# Patient Record
Sex: MALE | Race: WHITE | NOT HISPANIC OR LATINO | Employment: OTHER | URBAN - METROPOLITAN AREA
[De-identification: names, ages, dates, MRNs, and addresses within clinical notes are randomized per-mention and may not be internally consistent; named-entity substitution may affect disease eponyms.]

---

## 2017-11-13 ENCOUNTER — HISTORICAL (OUTPATIENT)
Dept: RADIOLOGY | Facility: HOSPITAL | Age: 62
End: 2017-11-13

## 2018-04-30 ENCOUNTER — HISTORICAL (OUTPATIENT)
Dept: RADIOLOGY | Facility: HOSPITAL | Age: 63
End: 2018-04-30

## 2018-06-12 ENCOUNTER — HISTORICAL (OUTPATIENT)
Dept: RADIOLOGY | Facility: HOSPITAL | Age: 63
End: 2018-06-12

## 2018-10-10 ENCOUNTER — HISTORICAL (OUTPATIENT)
Dept: RADIOLOGY | Facility: HOSPITAL | Age: 63
End: 2018-10-10

## 2023-02-01 DIAGNOSIS — G70.00 MYASTHENIA GRAVIS WITHOUT EXACERBATION: Primary | ICD-10-CM

## 2023-02-01 DIAGNOSIS — G70.00 MYASTHENIA GRAVIS: Primary | ICD-10-CM

## 2023-02-02 ENCOUNTER — HOSPITAL ENCOUNTER (OUTPATIENT)
Dept: RADIOLOGY | Facility: HOSPITAL | Age: 68
Discharge: HOME | End: 2023-02-02
Attending: FAMILY MEDICINE
Payer: MEDICARE

## 2023-02-02 DIAGNOSIS — G70.00 MYASTHENIA GRAVIS WITHOUT EXACERBATION: ICD-10-CM

## 2023-02-02 PROCEDURE — 71250 CT THORAX DX C-: CPT | Mod: TC

## 2023-02-09 ENCOUNTER — OFFICE VISIT (OUTPATIENT)
Dept: NEUROLOGY | Facility: CLINIC | Age: 68
End: 2023-02-09
Payer: MEDICARE

## 2023-02-09 VITALS
WEIGHT: 168 LBS | HEIGHT: 75 IN | BODY MASS INDEX: 20.89 KG/M2 | SYSTOLIC BLOOD PRESSURE: 118 MMHG | DIASTOLIC BLOOD PRESSURE: 74 MMHG

## 2023-02-09 DIAGNOSIS — G70.00 MYASTHENIA GRAVIS: Primary | ICD-10-CM

## 2023-02-09 PROCEDURE — 3008F BODY MASS INDEX DOCD: CPT | Mod: CPTII,S$GLB,, | Performed by: SPECIALIST

## 2023-02-09 PROCEDURE — 99999 PR PBB SHADOW E&M-EST. PATIENT-LVL III: ICD-10-PCS | Mod: PBBFAC,,, | Performed by: SPECIALIST

## 2023-02-09 PROCEDURE — 3008F PR BODY MASS INDEX (BMI) DOCUMENTED: ICD-10-PCS | Mod: CPTII,S$GLB,, | Performed by: SPECIALIST

## 2023-02-09 PROCEDURE — 99205 OFFICE O/P NEW HI 60 MIN: CPT | Mod: S$GLB,,, | Performed by: SPECIALIST

## 2023-02-09 PROCEDURE — 99999 PR PBB SHADOW E&M-EST. PATIENT-LVL III: CPT | Mod: PBBFAC,,, | Performed by: SPECIALIST

## 2023-02-09 PROCEDURE — 4010F PR ACE/ARB THEARPY RXD/TAKEN: ICD-10-PCS | Mod: CPTII,S$GLB,, | Performed by: SPECIALIST

## 2023-02-09 PROCEDURE — 1160F PR REVIEW ALL MEDS BY PRESCRIBER/CLIN PHARMACIST DOCUMENTED: ICD-10-PCS | Mod: CPTII,S$GLB,, | Performed by: SPECIALIST

## 2023-02-09 PROCEDURE — 1159F MED LIST DOCD IN RCRD: CPT | Mod: CPTII,S$GLB,, | Performed by: SPECIALIST

## 2023-02-09 PROCEDURE — 99205 PR OFFICE/OUTPT VISIT, NEW, LEVL V, 60-74 MIN: ICD-10-PCS | Mod: S$GLB,,, | Performed by: SPECIALIST

## 2023-02-09 PROCEDURE — 3074F SYST BP LT 130 MM HG: CPT | Mod: CPTII,S$GLB,, | Performed by: SPECIALIST

## 2023-02-09 PROCEDURE — 3078F PR MOST RECENT DIASTOLIC BLOOD PRESSURE < 80 MM HG: ICD-10-PCS | Mod: CPTII,S$GLB,, | Performed by: SPECIALIST

## 2023-02-09 PROCEDURE — 3074F PR MOST RECENT SYSTOLIC BLOOD PRESSURE < 130 MM HG: ICD-10-PCS | Mod: CPTII,S$GLB,, | Performed by: SPECIALIST

## 2023-02-09 PROCEDURE — 1159F PR MEDICATION LIST DOCUMENTED IN MEDICAL RECORD: ICD-10-PCS | Mod: CPTII,S$GLB,, | Performed by: SPECIALIST

## 2023-02-09 PROCEDURE — 3078F DIAST BP <80 MM HG: CPT | Mod: CPTII,S$GLB,, | Performed by: SPECIALIST

## 2023-02-09 PROCEDURE — 4010F ACE/ARB THERAPY RXD/TAKEN: CPT | Mod: CPTII,S$GLB,, | Performed by: SPECIALIST

## 2023-02-09 PROCEDURE — 1160F RVW MEDS BY RX/DR IN RCRD: CPT | Mod: CPTII,S$GLB,, | Performed by: SPECIALIST

## 2023-02-09 RX ORDER — PREDNISONE 10 MG/1
10 TABLET ORAL DAILY
Qty: 30 TABLET | Refills: 2 | Status: SHIPPED | OUTPATIENT
Start: 2023-02-09 | End: 2023-04-18 | Stop reason: SDUPTHER

## 2023-02-09 RX ORDER — HYDROCHLOROTHIAZIDE 25 MG/1
25 TABLET ORAL DAILY
COMMUNITY
Start: 2023-01-15

## 2023-02-09 RX ORDER — OMEPRAZOLE 20 MG/1
20 CAPSULE, DELAYED RELEASE ORAL DAILY
COMMUNITY
Start: 2023-01-15

## 2023-02-09 RX ORDER — PYRIDOSTIGMINE BROMIDE 60 MG/1
TABLET ORAL
Qty: 90 TABLET | Refills: 2 | Status: SHIPPED | OUTPATIENT
Start: 2023-02-09 | End: 2023-04-18

## 2023-02-09 RX ORDER — AMLODIPINE AND BENAZEPRIL HYDROCHLORIDE 5; 20 MG/1; MG/1
1 CAPSULE ORAL
COMMUNITY
Start: 2023-01-10

## 2023-02-09 RX ORDER — ATORVASTATIN CALCIUM 20 MG/1
20 TABLET, FILM COATED ORAL DAILY
COMMUNITY
Start: 2022-12-18

## 2023-02-09 NOTE — PROGRESS NOTES
"Subjective:       Patient ID: Ronak Saha is a 67 y.o. male.    Chief Complaint: NP ref by Dr Sibley for neuro cons to eval for MG.    HPI:            Pts wife is here.   Pt diagnosed with MG abt 3 wks ago.   Pt c/o double vision, droopy eyelids .   Denies diff w swallowing, speech or weakness of limbs.      notes may also be on facesheet for HPI, ROS, and other sections     Review of Systems          Social History     Socioeconomic History    Marital status:    Tobacco Use    Smoking status: Never     Passive exposure: Never    Smokeless tobacco: Never   Substance and Sexual Activity    Alcohol use: Never    Drug use: Never     ----------------------------  Hypertension      Current Outpatient Medications:     amlodipine-benazepril 5-20 mg (LOTREL) 5-20 mg per capsule, Take 1 capsule by mouth., Disp: , Rfl:     atorvastatin (LIPITOR) 20 MG tablet, Take 20 mg by mouth., Disp: , Rfl:     hydroCHLOROthiazide (HYDRODIURIL) 25 MG tablet, Take 25 mg by mouth., Disp: , Rfl:     omeprazole (PRILOSEC) 20 MG capsule, Take 20 mg by mouth., Disp: , Rfl:      Objective:        Exam:   /74   Ht 6' 3" (1.905 m)   Wt 76.2 kg (168 lb)   BMI 21.00 kg/m²     General Exam  if accompanied, by__ wife  body habitus_ Body mass index is 21 kg/m².    mental status_alert and appropriate  oropharynx_Mallampati grade_4  neck_  Heart__ RRR  Extremities_ no edema   skin_    Neurological:  cortical function__ ok   Speech __ ok  good volume   cranial nerves:  CN 2 VF_ok   fundi_   CN 3, 4, 6 EOMs_mild dysconj gaze   squints either eye for diplopia   CN 3, pupils_ok    CN 7_no lower face asymmetry  CN 8_hearing _ chr hearing impairment L ear   CN 12 tongue_ok    Motor__ arose from ch with arms folded but slowly   tone:   muscle stretch reflexes__ trace to absent equal   Vib sens_  Pin sens_  Plantars__ not tested   tremor: _ none   coordination: _ F to N ok   gait_ ok for age   Romberg:     imaging:  Chest CT had been ok "   Report reviewed     Labs: pos AchR Ab     _._ new patient   ___ multiple issues/ diagnoses or problems  [if not enumerated in note then discussed in encounter but not documented]    complexity of data     __high _.mod   __ images and reports reviewed:  __ hx obtained from family or accompaniment:   __other studies reviewed   __studies ordered __   __studies considered or discussed but not ordered __  __DDx discussed __    Risks    _._high _mod   __ (possible or definite) neurodegenerative condition and inherent progression  _._ (poss or def) autoimmune condition with possibility of flares or unexpected attack  __ (poss or def) seiz d.o. with possib of recurr seiz's   __ cerebrovasc ds with risk of recurrence of stroke  _._ CNS meds (and/or) potentially high risk non CNS meds which may cause medical or behavioral side effects  _._ fall risk  _._ driving discussed   __ diagnosis unclear or DDx wide making risk uncertain to high  __other:    MDM/Medical Decision Making     _._high  _moderate         Assessment/Plan:       Problem List Items Addressed This Visit    None  Visit Diagnoses       Myasthenia gravis    -  Primary              Other comments/ follow up:           Medications Ordered This Encounter   Medications    predniSONE (DELTASONE) 10 MG tablet     Sig: Take 1 tablet (10 mg total) by mouth once daily.     Dispense:  30 tablet     Refill:  2    pyridostigmine (MESTINON) 60 mg Tab     Sig: Half tab breakfast one week then half tab breakfast and lunch one week then half tab three times daily with meals one week then whole tab three times daily with meals     Dispense:  90 tablet     Refill:  2        Aim follow up __2_ months  _MD STEPHEN GuerraA

## 2023-04-18 ENCOUNTER — OFFICE VISIT (OUTPATIENT)
Dept: NEUROLOGY | Facility: CLINIC | Age: 68
End: 2023-04-18
Payer: MEDICARE

## 2023-04-18 VITALS
BODY MASS INDEX: 34.19 KG/M2 | WEIGHT: 275 LBS | HEIGHT: 75 IN | SYSTOLIC BLOOD PRESSURE: 118 MMHG | DIASTOLIC BLOOD PRESSURE: 74 MMHG

## 2023-04-18 DIAGNOSIS — G70.00 MYASTHENIA GRAVIS: Primary | ICD-10-CM

## 2023-04-18 PROCEDURE — 1101F PR PT FALLS ASSESS DOC 0-1 FALLS W/OUT INJ PAST YR: ICD-10-PCS | Mod: CPTII,S$GLB,, | Performed by: NURSE PRACTITIONER

## 2023-04-18 PROCEDURE — 3008F PR BODY MASS INDEX (BMI) DOCUMENTED: ICD-10-PCS | Mod: CPTII,S$GLB,, | Performed by: NURSE PRACTITIONER

## 2023-04-18 PROCEDURE — 3078F DIAST BP <80 MM HG: CPT | Mod: CPTII,S$GLB,, | Performed by: NURSE PRACTITIONER

## 2023-04-18 PROCEDURE — 1159F MED LIST DOCD IN RCRD: CPT | Mod: CPTII,S$GLB,, | Performed by: NURSE PRACTITIONER

## 2023-04-18 PROCEDURE — 3074F SYST BP LT 130 MM HG: CPT | Mod: CPTII,S$GLB,, | Performed by: NURSE PRACTITIONER

## 2023-04-18 PROCEDURE — 3288F PR FALLS RISK ASSESSMENT DOCUMENTED: ICD-10-PCS | Mod: CPTII,S$GLB,, | Performed by: NURSE PRACTITIONER

## 2023-04-18 PROCEDURE — 4010F ACE/ARB THERAPY RXD/TAKEN: CPT | Mod: CPTII,S$GLB,, | Performed by: NURSE PRACTITIONER

## 2023-04-18 PROCEDURE — 3074F PR MOST RECENT SYSTOLIC BLOOD PRESSURE < 130 MM HG: ICD-10-PCS | Mod: CPTII,S$GLB,, | Performed by: NURSE PRACTITIONER

## 2023-04-18 PROCEDURE — 1159F PR MEDICATION LIST DOCUMENTED IN MEDICAL RECORD: ICD-10-PCS | Mod: CPTII,S$GLB,, | Performed by: NURSE PRACTITIONER

## 2023-04-18 PROCEDURE — 99214 PR OFFICE/OUTPT VISIT, EST, LEVL IV, 30-39 MIN: ICD-10-PCS | Mod: S$GLB,,, | Performed by: NURSE PRACTITIONER

## 2023-04-18 PROCEDURE — 4010F PR ACE/ARB THEARPY RXD/TAKEN: ICD-10-PCS | Mod: CPTII,S$GLB,, | Performed by: NURSE PRACTITIONER

## 2023-04-18 PROCEDURE — 3288F FALL RISK ASSESSMENT DOCD: CPT | Mod: CPTII,S$GLB,, | Performed by: NURSE PRACTITIONER

## 2023-04-18 PROCEDURE — 99214 OFFICE O/P EST MOD 30 MIN: CPT | Mod: S$GLB,,, | Performed by: NURSE PRACTITIONER

## 2023-04-18 PROCEDURE — 99999 PR PBB SHADOW E&M-EST. PATIENT-LVL III: CPT | Mod: PBBFAC,,, | Performed by: SPECIALIST

## 2023-04-18 PROCEDURE — 3078F PR MOST RECENT DIASTOLIC BLOOD PRESSURE < 80 MM HG: ICD-10-PCS | Mod: CPTII,S$GLB,, | Performed by: NURSE PRACTITIONER

## 2023-04-18 PROCEDURE — 3008F BODY MASS INDEX DOCD: CPT | Mod: CPTII,S$GLB,, | Performed by: NURSE PRACTITIONER

## 2023-04-18 PROCEDURE — 99999 PR PBB SHADOW E&M-EST. PATIENT-LVL III: ICD-10-PCS | Mod: PBBFAC,,, | Performed by: SPECIALIST

## 2023-04-18 PROCEDURE — 1101F PT FALLS ASSESS-DOCD LE1/YR: CPT | Mod: CPTII,S$GLB,, | Performed by: NURSE PRACTITIONER

## 2023-04-18 RX ORDER — AZATHIOPRINE 50 MG/1
TABLET ORAL
Qty: 60 TABLET | Refills: 5 | Status: SHIPPED | OUTPATIENT
Start: 2023-04-18 | End: 2023-05-22

## 2023-04-18 RX ORDER — PYRIDOSTIGMINE BROMIDE 60 MG/1
TABLET ORAL
Qty: 90 TABLET | Refills: 2
Start: 2023-04-18 | End: 2023-05-14 | Stop reason: SDUPTHER

## 2023-04-18 RX ORDER — FLUTICASONE PROPIONATE 50 MCG
2 SPRAY, SUSPENSION (ML) NASAL
COMMUNITY
Start: 2023-04-01

## 2023-04-18 RX ORDER — PREDNISONE 10 MG/1
10 TABLET ORAL DAILY
Qty: 30 TABLET | Refills: 2 | Status: SHIPPED | OUTPATIENT
Start: 2023-04-18 | End: 2023-07-14 | Stop reason: SDUPTHER

## 2023-04-18 NOTE — PROGRESS NOTES
Established Patient   SUBJECTIVE:    Patient ID: Ronak Saha , 68 y.o.    Past Medical History:   Diagnosis Date    Hypertension        Past Surgical History:   Procedure Laterality Date    CHOLECYSTECTOMY      HERNIA REPAIR         Family History   Problem Relation Age of Onset    No Known Problems Mother     No Known Problems Father        Social History     Socioeconomic History    Marital status:    Tobacco Use    Smoking status: Never     Passive exposure: Never    Smokeless tobacco: Never   Substance and Sexual Activity    Alcohol use: Never    Drug use: Never       Review of patient's allergies indicates:   Allergen Reactions    Penicillins Rash    Cephalexin Palpitations       Chief Complaint: MG f/u    History of Present Illness:     Here for 2 month MG f/u     Pt reports blurred/double vision and eyelid drooping has almost completely resolved since start of medications.     Taking Mestinon 60 mg TID and prednisone 10 mg daily, tolerating well.    Review of Systems - as per HPI, otherwise a balanced 10 systems review is negative.      Current Medications:    Current Outpatient Medications:     amlodipine-benazepril 5-20 mg (LOTREL) 5-20 mg per capsule, Take 1 capsule by mouth., Disp: , Rfl:     atorvastatin (LIPITOR) 20 MG tablet, Take 20 mg by mouth., Disp: , Rfl:     fluticasone propionate (FLONASE) 50 mcg/actuation nasal spray, 2 sprays by Each Nostril route., Disp: , Rfl:     hydroCHLOROthiazide (HYDRODIURIL) 25 MG tablet, Take 25 mg by mouth., Disp: , Rfl:     omeprazole (PRILOSEC) 20 MG capsule, Take 20 mg by mouth., Disp: , Rfl:     predniSONE (DELTASONE) 10 MG tablet, Take 1 tablet (10 mg total) by mouth once daily., Disp: 30 tablet, Rfl: 2    pyridostigmine (MESTINON) 60 mg Tab, Half tab breakfast one week then half tab breakfast and lunch one week then half tab three times daily with meals one week then whole tab three times daily with meals, Disp: 90 tablet, Rfl:  "2      OBJECTIVE:    Vitals:  /74 (BP Location: Left arm, Patient Position: Sitting)   Ht 6' 3" (1.905 m)   Wt 124.7 kg (275 lb)   BMI 34.37 kg/m²      Physical Exam:  Constitutional  he appears well-developed and well-nourished. he is well groomed.    Accompanied by - self  Appearance - well appearing, no apparent distress, unassisted  Heart - RRR auscultated without murmur  Lungs - CTA   Skin- no obvious lesions noted    Neurologic  Cortical function - The patient is alert, attentive, and oriented  Speech - clear   Cranial nerves:  CN 3, 4, 6 EOMs - normal. Slight ptosis    CN 7 - no face asymmetry; normal eye closure and smile  CN 8 - chr hearing impairment L ear   Motor - grossly normal; unable to arise with arms folded  Gait - unassisted; posture stooped              ASSESSMENT /PLAN:    Problem List Items Addressed This Visit      Visit Diagnoses       Myasthenia gravis    -  Primary    Start Imuran 50mg,   Wk 1-2: 1 tab daily    Starting in May: take 2 tabs daily   Anticipated response and possible side effects of new medication discussed. Pt denies questions at this time. Instructed pt to call with any questions or concerns     Continue Prednisone 10mg, 1 daily + Mestinon 60mg, 1 TID    CBC, CMP in early May    F/u in 4 wks                 Questions and concerns were sought and answered to the patient's stated verbal satisfaction.    The patient verbalizes understanding and agreement with the above stated treatment plan.   Dr. Pena was available during today's encounter.     Items discussed include acute and/or chronic neurological, sleep, or other issues and their attendant differential diagnoses.  Potential for additional testing, treatment options, and prognosis also discussed.    __*_single dx ___multiple issues/ diagnoses  ___ low __* mod ___ high complexity of data  ___low _*_mod ___ high risks     Medical Decision Making (MDM) used for CPT " choice:  ___low  _*__moderate  ____high        ERICK Titus  [facility]  [phone]

## 2023-04-30 ENCOUNTER — HOSPITAL ENCOUNTER (EMERGENCY)
Facility: HOSPITAL | Age: 68
Discharge: LEFT BEFORE DISCHARGE | End: 2023-05-01
Attending: INTERNAL MEDICINE
Payer: MEDICARE

## 2023-04-30 DIAGNOSIS — A41.9 SEPSIS, DUE TO UNSPECIFIED ORGANISM, UNSPECIFIED WHETHER ACUTE ORGAN DYSFUNCTION PRESENT: ICD-10-CM

## 2023-04-30 DIAGNOSIS — R50.9 FEVER, UNSPECIFIED FEVER CAUSE: Primary | ICD-10-CM

## 2023-04-30 DIAGNOSIS — R00.0 TACHYCARDIA: ICD-10-CM

## 2023-04-30 LAB
ALBUMIN SERPL-MCNC: 3.1 G/DL (ref 3.4–4.8)
ALBUMIN/GLOB SERPL: 0.9 RATIO (ref 1.1–2)
ALP SERPL-CCNC: 79 UNIT/L (ref 40–150)
ALT SERPL-CCNC: 22 UNIT/L (ref 0–55)
APPEARANCE UR: CLEAR
AST SERPL-CCNC: 18 UNIT/L (ref 5–34)
BACTERIA #/AREA URNS AUTO: ABNORMAL /HPF
BASOPHILS # BLD AUTO: 0.05 X10(3)/MCL (ref 0–0.2)
BASOPHILS NFR BLD AUTO: 0.3 %
BILIRUB UR QL STRIP.AUTO: NEGATIVE MG/DL
BILIRUBIN DIRECT+TOT PNL SERPL-MCNC: 1.5 MG/DL
BUN SERPL-MCNC: 15 MG/DL (ref 8.4–25.7)
CALCIUM SERPL-MCNC: 9.4 MG/DL (ref 8.8–10)
CHLORIDE SERPL-SCNC: 98 MMOL/L (ref 98–107)
CO2 SERPL-SCNC: 23 MMOL/L (ref 23–31)
COLOR UR AUTO: ABNORMAL
CREAT SERPL-MCNC: 0.9 MG/DL (ref 0.73–1.18)
EOSINOPHIL # BLD AUTO: 0.13 X10(3)/MCL (ref 0–0.9)
EOSINOPHIL NFR BLD AUTO: 0.8 %
ERYTHROCYTE [DISTWIDTH] IN BLOOD BY AUTOMATED COUNT: 15.5 % (ref 11.5–17)
FLUAV AG UPPER RESP QL IA.RAPID: NOT DETECTED
FLUBV AG UPPER RESP QL IA.RAPID: NOT DETECTED
GFR SERPLBLD CREATININE-BSD FMLA CKD-EPI: >60 MLS/MIN/1.73/M2
GLOBULIN SER-MCNC: 3.5 GM/DL (ref 2.4–3.5)
GLUCOSE SERPL-MCNC: 172 MG/DL (ref 82–115)
GLUCOSE UR QL STRIP.AUTO: NEGATIVE MG/DL
HCT VFR BLD AUTO: 48.3 % (ref 42–52)
HGB BLD-MCNC: 15.9 G/DL (ref 14–18)
IMM GRANULOCYTES # BLD AUTO: 0.11 X10(3)/MCL (ref 0–0.04)
IMM GRANULOCYTES NFR BLD AUTO: 0.7 %
KETONES UR QL STRIP.AUTO: ABNORMAL MG/DL
LACTATE SERPL-SCNC: 1.7 MMOL/L (ref 0.5–2.2)
LACTATE SERPL-SCNC: 2.1 MMOL/L (ref 0.5–2.2)
LEUKOCYTE ESTERASE UR QL STRIP.AUTO: NEGATIVE UNIT/L
LYMPHOCYTES # BLD AUTO: 0.23 X10(3)/MCL (ref 0.6–4.6)
LYMPHOCYTES NFR BLD AUTO: 1.4 %
MCH RBC QN AUTO: 29.2 PG (ref 27–31)
MCHC RBC AUTO-ENTMCNC: 32.9 G/DL (ref 33–36)
MCV RBC AUTO: 88.8 FL (ref 80–94)
MONOCYTES # BLD AUTO: 0.68 X10(3)/MCL (ref 0.1–1.3)
MONOCYTES NFR BLD AUTO: 4.2 %
MUCOUS THREADS URNS QL MICRO: ABNORMAL /LPF
NEUTROPHILS # BLD AUTO: 14.9 X10(3)/MCL (ref 2.1–9.2)
NEUTROPHILS NFR BLD AUTO: 92.6 %
NITRITE UR QL STRIP.AUTO: NEGATIVE
PH UR STRIP.AUTO: 6 [PH]
PLATELET # BLD AUTO: 213 X10(3)/MCL (ref 130–400)
PMV BLD AUTO: 9.8 FL (ref 7.4–10.4)
POTASSIUM SERPL-SCNC: 3.8 MMOL/L (ref 3.5–5.1)
PROT SERPL-MCNC: 6.6 GM/DL (ref 5.8–7.6)
PROT UR QL STRIP.AUTO: ABNORMAL MG/DL
RBC # BLD AUTO: 5.44 X10(6)/MCL (ref 4.7–6.1)
RBC #/AREA URNS AUTO: ABNORMAL /HPF
RBC UR QL AUTO: ABNORMAL UNIT/L
RSV A 5' UTR RNA NPH QL NAA+PROBE: NOT DETECTED
SARS-COV-2 RNA RESP QL NAA+PROBE: NOT DETECTED
SODIUM SERPL-SCNC: 133 MMOL/L (ref 136–145)
SP GR UR STRIP.AUTO: 1.02
SQUAMOUS #/AREA URNS AUTO: ABNORMAL /HPF
UROBILINOGEN UR STRIP-ACNC: 2 MG/DL
WBC # SPEC AUTO: 16.1 X10(3)/MCL (ref 4.5–11.5)
WBC #/AREA URNS AUTO: ABNORMAL /HPF

## 2023-04-30 PROCEDURE — 83605 ASSAY OF LACTIC ACID: CPT | Performed by: INTERNAL MEDICINE

## 2023-04-30 PROCEDURE — 25000003 PHARM REV CODE 250: Performed by: INTERNAL MEDICINE

## 2023-04-30 PROCEDURE — 96365 THER/PROPH/DIAG IV INF INIT: CPT

## 2023-04-30 PROCEDURE — 81001 URINALYSIS AUTO W/SCOPE: CPT | Performed by: INTERNAL MEDICINE

## 2023-04-30 PROCEDURE — 93005 ELECTROCARDIOGRAM TRACING: CPT

## 2023-04-30 PROCEDURE — 0241U COVID/RSV/FLU A&B PCR: CPT | Performed by: INTERNAL MEDICINE

## 2023-04-30 PROCEDURE — 63600175 PHARM REV CODE 636 W HCPCS: Performed by: INTERNAL MEDICINE

## 2023-04-30 PROCEDURE — 99285 EMERGENCY DEPT VISIT HI MDM: CPT | Mod: 25

## 2023-04-30 PROCEDURE — 93010 EKG 12-LEAD: ICD-10-PCS | Mod: ,,, | Performed by: INTERNAL MEDICINE

## 2023-04-30 PROCEDURE — 85025 COMPLETE CBC W/AUTO DIFF WBC: CPT | Performed by: INTERNAL MEDICINE

## 2023-04-30 PROCEDURE — 87040 BLOOD CULTURE FOR BACTERIA: CPT | Performed by: INTERNAL MEDICINE

## 2023-04-30 PROCEDURE — 93010 ELECTROCARDIOGRAM REPORT: CPT | Mod: ,,, | Performed by: INTERNAL MEDICINE

## 2023-04-30 PROCEDURE — 80053 COMPREHEN METABOLIC PANEL: CPT | Performed by: INTERNAL MEDICINE

## 2023-04-30 PROCEDURE — 96361 HYDRATE IV INFUSION ADD-ON: CPT

## 2023-04-30 RX ORDER — ACETAMINOPHEN 500 MG
1000 TABLET ORAL EVERY 6 HOURS PRN
Status: DISCONTINUED | OUTPATIENT
Start: 2023-04-30 | End: 2023-05-01 | Stop reason: HOSPADM

## 2023-04-30 RX ORDER — IBUPROFEN 600 MG/1
600 TABLET ORAL
Status: COMPLETED | OUTPATIENT
Start: 2023-04-30 | End: 2023-04-30

## 2023-04-30 RX ORDER — SODIUM CHLORIDE, SODIUM LACTATE, POTASSIUM CHLORIDE, CALCIUM CHLORIDE 600; 310; 30; 20 MG/100ML; MG/100ML; MG/100ML; MG/100ML
1000 INJECTION, SOLUTION INTRAVENOUS CONTINUOUS
Status: DISCONTINUED | OUTPATIENT
Start: 2023-04-30 | End: 2023-05-01 | Stop reason: HOSPADM

## 2023-04-30 RX ADMIN — ACETAMINOPHEN 1000 MG: 500 TABLET ORAL at 09:04

## 2023-04-30 RX ADMIN — SODIUM CHLORIDE, POTASSIUM CHLORIDE, SODIUM LACTATE AND CALCIUM CHLORIDE 2535 ML: 600; 310; 30; 20 INJECTION, SOLUTION INTRAVENOUS at 08:04

## 2023-04-30 RX ADMIN — SODIUM CHLORIDE, POTASSIUM CHLORIDE, SODIUM LACTATE AND CALCIUM CHLORIDE 1000 ML: 600; 310; 30; 20 INJECTION, SOLUTION INTRAVENOUS at 10:04

## 2023-04-30 RX ADMIN — IBUPROFEN 600 MG: 600 TABLET, FILM COATED ORAL at 10:04

## 2023-04-30 RX ADMIN — TIGECYCLINE 100 MG: 50 INJECTION, POWDER, LYOPHILIZED, FOR SOLUTION INTRAVENOUS; PARENTERAL at 10:04

## 2023-05-01 ENCOUNTER — TELEPHONE (OUTPATIENT)
Dept: NEUROLOGY | Facility: CLINIC | Age: 68
End: 2023-05-01
Payer: MEDICARE

## 2023-05-01 VITALS
SYSTOLIC BLOOD PRESSURE: 113 MMHG | HEART RATE: 109 BPM | DIASTOLIC BLOOD PRESSURE: 68 MMHG | TEMPERATURE: 99 F | WEIGHT: 250 LBS | RESPIRATION RATE: 18 BRPM | BODY MASS INDEX: 31.25 KG/M2 | OXYGEN SATURATION: 94 %

## 2023-05-01 NOTE — TELEPHONE ENCOUNTER
Patient states he is having issues that caused him to spend night in ED last night.     Per ED note, pt arrived with fever, chills, body aches, and tachycardia.    Per patient ED physician had some concerns about Imuran 50mg that was started during last OV here on 04/18/2023 having side effects for pt.     Pt requesting call back to further discuss this med.     Phone: [phone]

## 2023-05-01 NOTE — ED PROVIDER NOTES
05/01/2023         10:02 PM    Source of History:  History obtained from the patient.     Chief complaint:  From Nurse Triage:  Fever (States began yesterday with fever, body aches, and congestion. )    HISTORY OF PRESENT ILLNES:  Ronak Saha is a 68 y.o. male  has a past medical history of Hypertension, Meniere's disease, unspecified ear, and Myasthenia gravis. presenting with Fever (States began yesterday with fever, body aches, and congestion. )      REVIEW OF SYSTEMS:   Constitutional symptoms:  Fever. Chills    Skin symptoms:  No Rash.    Eye symptoms:  No Visual disturbance reported.   ENMT symptoms:  No Sore throat,  sinus congestion, runny nose  Respiratory symptoms:  No Shortness of Breath, Cough, no Wheezing.    Cardiovascular symptoms:  No Chest Pain, No Palpitations.   Gastrointestinal symptoms:  No Abdominal Pain, No Nausea, No Vomiting, No Diarrhea, No Constipation.    Genitourinary symptoms:  No Dysuria,    Musculoskeletal symptoms:  No Back pain,    Neurologic symptoms:  No Headache, No Dizziness.    Psychiatric symptoms:  No Anxiety, No Depression, No Substance Abuse.              Additional review of systems information: Patient Denies Any Other Complaints.    All Other Systems Reviewed With Patient And Negative.    ALLEGIES:  Review of patient's allergies indicates:   Allergen Reactions    Penicillins Rash    Cephalexin Palpitations       MEDICINE LIST:  Current Outpatient Medications   Medication Instructions    amlodipine-benazepril 5-20 mg (LOTREL) 5-20 mg per capsule 1 capsule, Oral    atorvastatin (LIPITOR) 20 mg, Oral    azaTHIOprine (IMURAN) 50 mg Tab Wk 1-2: 1 tab daily; Starting in May: take 2 tabs daily    fluticasone propionate (FLONASE) 50 mcg/actuation nasal spray 2 sprays, Each Nostril    hydroCHLOROthiazide (HYDRODIURIL) 25 mg, Oral    omeprazole (PRILOSEC) 20 mg, Oral    predniSONE (DELTASONE) 10 mg, Oral, Daily    pyridostigmine (MESTINON) 60 mg Tab 1 tab three times daily  with meals        PMH:  As per HPI and below:    Reviewed and updated in chart.    PAST MEDICAL HISTORY:  Past Medical History:   Diagnosis Date    Hypertension     Meniere's disease, unspecified ear     Myasthenia gravis         PAST SURGICAL HISTORY:  Past Surgical History:   Procedure Laterality Date    CHOLECYSTECTOMY      HERNIA REPAIR         SOCIAL HISTORY:  Social History     Tobacco Use    Smoking status: Never     Passive exposure: Never    Smokeless tobacco: Never   Substance Use Topics    Alcohol use: Never    Drug use: Never       FAMILY HISTORY:  Family History   Problem Relation Age of Onset    No Known Problems Mother     No Known Problems Father         PROBLEM LIST:  There is no problem list on file for this patient.       PHYSICAL EXAM:      ED Triage Vitals [04/30/23 1932]   /78   Pulse (!) 152   Resp 20   Temp (!) 102.9 °F (39.4 °C)   SpO2 95 %        Vital Signs: Reviewed As In Chart.  General:  Alert, No Cardiorespiratory Distress Noted.   Eye:  Extraocular Movements Are Intact.   ENT: Mucus membranes are moist.   Cardiovascular:  Regular tachycardia, No Murmur, No Pedal Edema.    Respiratory:  Respirations Nonlabored, No Respiratory Distress, Good Bilateral Air Entry, No Rales, No Rhonchi.    Gastrointestinal:  Soft, Non Distended, Non Tenderness, Normal Bowel Sounds.    Neurological:  Alert And Oriented To Person, Place, Time, And Situation, Normal Motor Observed, Normal Speech Observed.  Musculoskeletal:  No Gross Deformity Noted.     Psychiatric:  Cooperative.      ED WORKUP FOR MEDICAL DECISION MAKING:    ED ORDERS:  Orders Placed This Encounter   Procedures    Critical Care    Blood Culture    Blood Culture    X-Ray Chest AP Portable    CT Head Without Contrast    CBC auto differential    Comprehensive metabolic panel    Lactic acid, plasma    Urinalysis, Reflex to Urine Culture    CBC with Differential    Lactic Acid, Plasma    COVID/RSV/FLU A&B PCR    Urinalysis, Microscopic     ED Preference List Used to Initiate Sepsis Orders    Vital signs    Cardiac Monitoring - Adult    Strict intake and output    Pulse Oximetry Continuous    EKG 12-lead    Saline lock IV       ED MEDICINES:  Medications   acetaminophen tablet 1,000 mg (1,000 mg Oral Given 4/30/23 2121)   lactated ringers infusion (1,000 mLs Intravenous New Bag 4/30/23 2212)   lactated ringers bolus 2,535 mL (0 mLs Intravenous Stopped 4/30/23 2124)   tigecycline (TYGACIL) 100 mg in dextrose 5 % (D5W) 100 mL IVPB (0 mg Intravenous Stopped 4/30/23 2301)   ibuprofen tablet 600 mg (600 mg Oral Given 4/30/23 2231)            ECG Results              EKG 12-lead (Preliminary result)  Result time 04/30/23 22:08:40      Wet Read by Alexa Ward MD (04/30/23 22:08:40, [facility] - Emergency Dept, Emergency Medicine)    EKG: Independently reviewed and / or Interpreted by Alexa Ward MD. independently as Normal Sinus Rhythm, Rate 144, Sinus Tachycardia, LVH, Left Anterior Fascicular Block, No STEMI, Left Axis Deviation , artifact in the baseline.                                    ED LABS ORDERED AND REVIEWED:  Admission on 04/30/2023   Component Date Value Ref Range Status    Sodium Level 04/30/2023 133 (L)  136 - 145 mmol/L Final    Potassium Level 04/30/2023 3.8  3.5 - 5.1 mmol/L Final    Chloride 04/30/2023 98  98 - 107 mmol/L Final    Carbon Dioxide 04/30/2023 23  23 - 31 mmol/L Final    Glucose Level 04/30/2023 172 (H)  82 - 115 mg/dL Final    Blood Urea Nitrogen 04/30/2023 15.0  8.4 - 25.7 mg/dL Final    Creatinine 04/30/2023 0.90  0.73 - 1.18 mg/dL Final    Calcium Level Total 04/30/2023 9.4  8.8 - 10.0 mg/dL Final    Protein Total 04/30/2023 6.6  5.8 - 7.6 gm/dL Final    Albumin Level 04/30/2023 3.1 (L)  3.4 - 4.8 g/dL Final    Globulin 04/30/2023 3.5  2.4 - 3.5 gm/dL Final    Albumin/Globulin Ratio 04/30/2023 0.9 (L)  1.1 - 2.0 ratio Final    Bilirubin Total 04/30/2023 1.5  <=1.5 mg/dL Final    Alkaline Phosphatase  04/30/2023 79  40 - 150 unit/L Final    Alanine Aminotransferase 04/30/2023 22  0 - 55 unit/L Final    Aspartate Aminotransferase 04/30/2023 18  5 - 34 unit/L Final    eGFR 04/30/2023 >60  mls/min/1.73/m2 Final    Lactic Acid Level 04/30/2023 2.1  0.5 - 2.2 mmol/L Final    Color, UA 04/30/2023 Orange (A)  Yellow, Light-Yellow, Dark Yellow, Gege, Straw Final    Appearance, UA 04/30/2023 Clear  Clear Final    Specific Gravity, UA 04/30/2023 1.020   Final    pH, UA 04/30/2023 6.0  5.0 - 8.5 Final    Protein, UA 04/30/2023 1+ (A)  Negative mg/dL Final    Glucose, UA 04/30/2023 Negative  Negative, Normal mg/dL Final    Ketones, UA 04/30/2023 Trace (A)  Negative mg/dL Final    Blood, UA 04/30/2023 Trace-Intact (A)  Negative unit/L Final    Bilirubin, UA 04/30/2023 Negative  Negative mg/dL Final    Urobilinogen, UA 04/30/2023 2.0 (A)  0.2, 1.0, Normal mg/dL Final    Nitrites, UA 04/30/2023 Negative  Negative Final    Leukocyte Esterase, UA 04/30/2023 Negative  Negative unit/L Final    WBC 04/30/2023 16.1 (H)  4.5 - 11.5 x10(3)/mcL Final    RBC 04/30/2023 5.44  4.70 - 6.10 x10(6)/mcL Final    Hgb 04/30/2023 15.9  14.0 - 18.0 g/dL Final    Hct 04/30/2023 48.3  42.0 - 52.0 % Final    MCV 04/30/2023 88.8  80.0 - 94.0 fL Final    MCH 04/30/2023 29.2  27.0 - 31.0 pg Final    MCHC 04/30/2023 32.9 (L)  33.0 - 36.0 g/dL Final    RDW 04/30/2023 15.5  11.5 - 17.0 % Final    Platelet 04/30/2023 213  130 - 400 x10(3)/mcL Final    MPV 04/30/2023 9.8  7.4 - 10.4 fL Final    Neut % 04/30/2023 92.6  % Final    Lymph % 04/30/2023 1.4  % Final    Mono % 04/30/2023 4.2  % Final    Eos % 04/30/2023 0.8  % Final    Basophil % 04/30/2023 0.3  % Final    Lymph # 04/30/2023 0.23 (L)  0.6 - 4.6 x10(3)/mcL Final    Neut # 04/30/2023 14.90 (H)  2.1 - 9.2 x10(3)/mcL Final    Mono # 04/30/2023 0.68  0.1 - 1.3 x10(3)/mcL Final    Eos # 04/30/2023 0.13  0 - 0.9 x10(3)/mcL Final    Baso # 04/30/2023 0.05  0 - 0.2 x10(3)/mcL Final    IG# 04/30/2023 0.11  (H)  0 - 0.04 x10(3)/mcL Final    IG% 04/30/2023 0.7  % Final    Influenza A PCR 04/30/2023 Not Detected  Not Detected Final    Influenza B PCR 04/30/2023 Not Detected  Not Detected Final    Respiratory Syncytial Virus PCR 04/30/2023 Not Detected  Not Detected Final    SARS-CoV-2 PCR 04/30/2023 Not Detected  Not Detected, Negative, Invalid Final    Bacteria, UA 04/30/2023 Rare  None Seen, Rare, Occasional /HPF Final    Mucous, UA 04/30/2023 Trace (A)  None Seen /LPF Final    RBC, UA 04/30/2023 3-5  None Seen, 0-2, 3-5, 0-5 /HPF Final    WBC, UA 04/30/2023 0-2  None Seen, 0-2, 3-5, 0-5 /HPF Final    Squamous Epithelial Cells, UA 04/30/2023 Few (A)  None Seen, Rare, Occasional, Occ /HPF Final    Lactic Acid Level 04/30/2023 1.7  0.5 - 2.2 mmol/L Final       RADIOLOGY STUDIES ORDERED AND REVIEWED:  Imaging Results              CT Head Without Contrast (Preliminary result)  Result time 04/30/23 23:53:03      Preliminary result by Kristopher Rodriguez MD (04/30/23 23:53:03)                   Narrative:    START OF REPORT:  Technique: CT of the head was performed without intravenous contrast with axial as well as coronal and sagittal images.    Comparison: None.    Dosage Information: Automated Exposure Control was utilized 959.15 mGy.cm.    Clinical history: Headache.    Findings:  Artifact: There is streak artifact on a few of the images/ in the posterior fossa which decreases sensitivity and specificity of the study for subtle intracranial hemorrhage.  Hemorrhage: No acute intracranial hemorrhage is seen.  CSF spaces: The ventricles, sulci and basal cisterns all appear mildly prominent consistent with global cerebral atrophy.  Brain parenchyma: There is preservation of the grey white junction throughout. Mild microvascular change is seen in portions of the periventricular and deep white matter tracts.  Cerebellum: Unremarkable.  Vascular: Severe atheromatous calcification of the intracranial arteries is seen.  Sella and  skull base: The sella appears to be within normal limits for age.  Herniation: None.  Intracranial calcifications: Incidental note is made of bilateral choroid plexus calcification. Incidental note is made of some pineal region calcification. Incidental note is made of some calcification of the falx.  Calvarium: No acute linear or depressed skull fracture is seen.    Maxillofacial Structures:  Paranasal sinuses: The visualized paranasal sinuses appear clear with no mucoperiosteal thickening or air fluid levels identified.  Orbits: The orbits appear unremarkable.  Zygomatic arches: The zygomatic arches are intact and unremarkable.  Temporal bones and mastoids: The temporal bones and mastoids appear unremarkable.  TMJ: The mandibular condyles appear normally placed with respect to the mandibular fossa.      Impression:  1. No acute intracranial process identified. Details and other findings as noted above.                                         X-Ray Chest AP Portable (Preliminary result)  Result time 04/30/23 21:18:40      Wet Read by Alexa Ward MD (04/30/23 21:18:40, [facility] - Emergency Dept, Emergency Medicine)    Chest One View:  Independently reviewed and/or interpreted by Alexa Ward MD.  No Focal Consolidation, No Acute Cardiopulmonary abnormality identified grossly.                                    MEDICAL DECISION MAKING:      Reviewed Nurses Note. Reviewed Vital Signs.     Reviewed Pertinent old records, History and updated as necessary.    Vitals:    05/01/23 0100   BP: 113/68   Pulse: 109   Resp: 18   Temp: 99 °F (37.2 °C)        Medical Decision Making  68 y.o. male  has a past medical history of Hypertension, Meniere's disease, unspecified ear, and Myasthenia gravis. presenting with Fever (States began yesterday with fever, body aches, and congestion. )    Patient mainly has the sinus congestion and body aches and fever and chills since yesterday, his have temperature came  out to be 103, he was tachycardic on arrival, start him on 30 mL/kg bolus for sepsis protocol, blood cultures are drawn, flu COVID test and the rest of the workup for pneumonia and sepsis is pending.    Problems Addressed:  Sepsis, due to unspecified organism, unspecified whether acute organ dysfunction present:     Details: Patient has myasthenia gravis, he is on the immunosuppressant medication, and he has 103 fever with tachycardia, even after giving IV fluids he still continues to be tachycardic, but is not in any distress, comfortable sitting in the bed with maintaining oxygen saturation.  He has gotten 30 mL/kg fluid bolus, I will give him antibiotic broad-spectrum to cover for a sepsis.  He is allergic to penicillins and cephalosporins and he cannot get the levofloxacin because of his the myasthenia gravis.  Decided to give him Tygacil    Amount and/or Complexity of Data Reviewed  Labs: ordered.  Radiology: ordered and independent interpretation performed.  ECG/medicine tests: ordered and independent interpretation performed.              ED Course as of 05/01/23 0115   Sun Apr 30, 2023 2156 Gave Tylenol to bring the fever down, patient continues to have tachycardia, he has gotten 30 mL/kg bolus, I will continue IV fluids, and I will give him IV antibiotic, unfortunately is allergic to penicillin and the cephalexin, and he has myasthenia so he cannot be given fluoroquinolones, [GQ]   2200 Patient says mainly he has the sinus congestion, runny nose, and he feels that his the fever is from sinus infection, his chest x-ray does not show any major pneumonia, urine does not show any major infection, lactic acid is 2.1, he still has fever, I have given him Tylenol to bring the fever down, I am giving him IV antibiotic and I have advised him that unfortunately we do not have any beds in this hospital so I will have to transfer him wherever I can find a bed, and patient and his wife are not too keen on going too  for for admission. [GQ]   2209 I will go and give him a dose of Motrin also for his fever, [GQ]   2301 Patient has gotten IV antibiotic, his lactic acid has come down, and his blood pressure has gone up. [GQ]   2323 Patient has gotten Tylenol and Motrin for fever, he still has elevated temperature, is still has tachycardia, is not in any distress, meron complaint he has is sinus congestion and the some headache, I will go ahead and do a CT head on him for headache and possible cyst acute bacterial sinusitis, does not have any other particular source of infection at this time but he is immunocompromised and needs to be admitted in the hospital for IV antibiotics till we have blood culture reports back.     [GQ]   2344 Patient's temperature at last started coming down, he has gotten his CT head done, and the result is pending. [GQ]   Mon May 01, 2023   0053 As such patient has tachypnea but he is not in any distress, he is able to talk in full sentence, [GQ]   0054 Since patient has atrial fibrillation, and he was in rapid ventricular response with AFib with a heart rate of almost 170 and now that his heart rate has come down, after Cardizem drip, his blood pressure also dropped, I do not want to put him on vasopressors at this time because I am afraid he will go into rapid AFib again, his heart rate is coming down as low as 105, I am giving him calcium gluconate to help with the blood pressure which might have come down because of the Cardizem. [GQ]   0109 CT scan read by the radiologist is negative, his fever has come down, his blood pressure is maintained 139/68, [GQ]   0112 Patient says that his fever has broken, and is not having any other symptoms and he would not like to be transferred anywhere, he says he would rather sign out against medical advise eyes and he will see his family doctor tomorrow morning.  I have advised him that he can always come back to the emergency room in case he continues to have  symptoms.  As such I have given him the antibiotic which is good for at least 12 hours he is immunocompromised due to his medication and he did have high fever with tachycardia and I still recommended that he should let me transfer him to a facility where he can be admitted and monitored at least till the blood cultures are available but patient and his wife refused to be transferred and wants to sign out AMA. [GQ]      ED Course User Index  [GQ] Alexa Ward MD            PROCEDURES PERFORMED IN ED:  Critical Care    Date/Time: 5/1/2023 1:15 AM  Performed by: Alexa Ward MD  Authorized by: Alexa Ward MD   Direct patient critical care time: 45 minutes  Total critical care time (exclusive of procedural time) : 45 minutes  Critical care was necessary to treat or prevent imminent or life-threatening deterioration of the following conditions: sepsis.  Critical care was time spent personally by me on the following activities: development of treatment plan with patient or surrogate, evaluation of patient's response to treatment, examination of patient, obtaining history from patient or surrogate, ordering and performing treatments and interventions, ordering and review of laboratory studies, ordering and review of radiographic studies, re-evaluation of patient's condition, pulse oximetry and review of old charts.        DIAGNOSTIC IMPRESSION:        ICD-10-CM ICD-9-CM   1. Fever, unspecified fever cause  R50.9 780.60   2. Tachycardia  R00.0 785.0   3. Sepsis, due to unspecified organism, unspecified whether acute organ dysfunction present  A41.9 038.9     995.91         ED Disposition Condition    AMA Stable               Medication List        ASK your doctor about these medications      amlodipine-benazepril 5-20 mg 5-20 mg per capsule  Commonly known as: LOTREL     atorvastatin 20 MG tablet  Commonly known as: LIPITOR     azaTHIOprine 50 mg Tab  Commonly known as: IMURAN  Wk 1-2: 1 tab daily; Starting  in May: take 2 tabs daily     fluticasone propionate 50 mcg/actuation nasal spray  Commonly known as: FLONASE     hydroCHLOROthiazide 25 MG tablet  Commonly known as: HYDRODIURIL     omeprazole 20 MG capsule  Commonly known as: PRILOSEC     predniSONE 10 MG tablet  Commonly known as: DELTASONE  Take 1 tablet (10 mg total) by mouth once daily.     pyridostigmine 60 mg Tab  Commonly known as: MESTINON  1 tab three times daily with meals                            Alexa Ward MD  05/01/23 0115

## 2023-05-01 NOTE — TELEPHONE ENCOUNTER
Spoke with pt  He didn't take Imuran today.   put him on antibiotics. They are still awaiting blood culture results    Ok to stop imuran and call us in a week

## 2023-05-06 LAB
BACTERIA BLD CULT: NORMAL
BACTERIA BLD CULT: NORMAL

## 2023-05-08 ENCOUNTER — TELEPHONE (OUTPATIENT)
Dept: NEUROLOGY | Facility: CLINIC | Age: 68
End: 2023-05-08
Payer: MEDICARE

## 2023-05-08 ENCOUNTER — PATIENT MESSAGE (OUTPATIENT)
Dept: NEUROLOGY | Facility: CLINIC | Age: 68
End: 2023-05-08
Payer: MEDICARE

## 2023-05-08 NOTE — TELEPHONE ENCOUNTER
Patient calling for update regarding previous message on 05/01/2023. He was advised on that telephone message to stop IMURAN and return call in 1 week.     Patient states he has received blood culture results, and were normal and no other issues were found as to why he was having illness he was having that lead to ED visit.     Reports since stopping IMURAN, he feels much better. Calling to touch base on treatment plan moving forward.     Phone: [phone]

## 2023-05-14 DIAGNOSIS — G70.00 MYASTHENIA GRAVIS: ICD-10-CM

## 2023-05-15 DIAGNOSIS — G70.00 MYASTHENIA GRAVIS: ICD-10-CM

## 2023-05-15 RX ORDER — PYRIDOSTIGMINE BROMIDE 60 MG/1
TABLET ORAL
Qty: 90 TABLET | Refills: 2
Start: 2023-05-15 | End: 2023-05-15 | Stop reason: SDUPTHER

## 2023-05-16 DIAGNOSIS — G70.00 MYASTHENIA GRAVIS: ICD-10-CM

## 2023-05-16 RX ORDER — PYRIDOSTIGMINE BROMIDE 60 MG/1
60 TABLET ORAL 3 TIMES DAILY
Qty: 90 TABLET | Refills: 3 | Status: SHIPPED | OUTPATIENT
Start: 2023-05-16 | End: 2023-08-22 | Stop reason: SDUPTHER

## 2023-05-16 RX ORDER — PYRIDOSTIGMINE BROMIDE 60 MG/1
TABLET ORAL
Qty: 90 TABLET | Refills: 2
Start: 2023-05-16 | End: 2023-05-16

## 2023-05-18 ENCOUNTER — PATIENT MESSAGE (OUTPATIENT)
Dept: NEUROLOGY | Facility: CLINIC | Age: 68
End: 2023-05-18
Payer: MEDICARE

## 2023-05-22 ENCOUNTER — OFFICE VISIT (OUTPATIENT)
Dept: NEUROLOGY | Facility: CLINIC | Age: 68
End: 2023-05-22
Payer: MEDICARE

## 2023-05-22 VITALS
SYSTOLIC BLOOD PRESSURE: 144 MMHG | DIASTOLIC BLOOD PRESSURE: 78 MMHG | BODY MASS INDEX: 33.57 KG/M2 | HEIGHT: 75 IN | WEIGHT: 270 LBS

## 2023-05-22 DIAGNOSIS — G70.00 MYASTHENIA GRAVIS: ICD-10-CM

## 2023-05-22 PROCEDURE — 1159F PR MEDICATION LIST DOCUMENTED IN MEDICAL RECORD: ICD-10-PCS | Mod: CPTII,S$GLB,, | Performed by: SPECIALIST

## 2023-05-22 PROCEDURE — 99213 OFFICE O/P EST LOW 20 MIN: CPT | Mod: S$GLB,,, | Performed by: SPECIALIST

## 2023-05-22 PROCEDURE — 1160F PR REVIEW ALL MEDS BY PRESCRIBER/CLIN PHARMACIST DOCUMENTED: ICD-10-PCS | Mod: CPTII,S$GLB,, | Performed by: SPECIALIST

## 2023-05-22 PROCEDURE — 99999 PR PBB SHADOW E&M-EST. PATIENT-LVL III: CPT | Mod: PBBFAC,,, | Performed by: SPECIALIST

## 2023-05-22 PROCEDURE — 3078F DIAST BP <80 MM HG: CPT | Mod: CPTII,S$GLB,, | Performed by: SPECIALIST

## 2023-05-22 PROCEDURE — 3008F BODY MASS INDEX DOCD: CPT | Mod: CPTII,S$GLB,, | Performed by: SPECIALIST

## 2023-05-22 PROCEDURE — 4010F ACE/ARB THERAPY RXD/TAKEN: CPT | Mod: CPTII,S$GLB,, | Performed by: SPECIALIST

## 2023-05-22 PROCEDURE — 3077F SYST BP >= 140 MM HG: CPT | Mod: CPTII,S$GLB,, | Performed by: SPECIALIST

## 2023-05-22 PROCEDURE — 1160F RVW MEDS BY RX/DR IN RCRD: CPT | Mod: CPTII,S$GLB,, | Performed by: SPECIALIST

## 2023-05-22 PROCEDURE — 3077F PR MOST RECENT SYSTOLIC BLOOD PRESSURE >= 140 MM HG: ICD-10-PCS | Mod: CPTII,S$GLB,, | Performed by: SPECIALIST

## 2023-05-22 PROCEDURE — 99213 PR OFFICE/OUTPT VISIT, EST, LEVL III, 20-29 MIN: ICD-10-PCS | Mod: S$GLB,,, | Performed by: SPECIALIST

## 2023-05-22 PROCEDURE — 99999 PR PBB SHADOW E&M-EST. PATIENT-LVL III: ICD-10-PCS | Mod: PBBFAC,,, | Performed by: SPECIALIST

## 2023-05-22 PROCEDURE — 4010F PR ACE/ARB THEARPY RXD/TAKEN: ICD-10-PCS | Mod: CPTII,S$GLB,, | Performed by: SPECIALIST

## 2023-05-22 PROCEDURE — 3008F PR BODY MASS INDEX (BMI) DOCUMENTED: ICD-10-PCS | Mod: CPTII,S$GLB,, | Performed by: SPECIALIST

## 2023-05-22 PROCEDURE — 1159F MED LIST DOCD IN RCRD: CPT | Mod: CPTII,S$GLB,, | Performed by: SPECIALIST

## 2023-05-22 PROCEDURE — 3078F PR MOST RECENT DIASTOLIC BLOOD PRESSURE < 80 MM HG: ICD-10-PCS | Mod: CPTII,S$GLB,, | Performed by: SPECIALIST

## 2023-05-22 NOTE — PROGRESS NOTES
"Subjective:         Patient ID: Ronak Saha is a 68 y.o. male.    Chief Complaint: MG    HPI:           F/U MG.  (Pt had to stop Imuran due to high fever and rash. Pt was seen at [place] ED and had a full work up that was normal. Pt denies any symptoms of MG at this time. )    notes may also be on facesheet for HPI, ROS, and other sections     ROS:   No diplopia dysphagia or dyspnea         Social History     Tobacco Use    Smoking status: Never     Passive exposure: Never    Smokeless tobacco: Never   Substance Use Topics    Alcohol use: Never    Drug use: Never        Current Outpatient Medications   Medication Instructions    amlodipine-benazepril 5-20 mg (LOTREL) 5-20 mg per capsule 1 capsule, Oral    atorvastatin (LIPITOR) 20 mg, Oral    fluticasone propionate (FLONASE) 50 mcg/actuation nasal spray 2 sprays, Each Nostril    hydroCHLOROthiazide (HYDRODIURIL) 25 mg, Oral    omeprazole (PRILOSEC) 20 mg, Oral    predniSONE (DELTASONE) 10 mg, Oral, Daily    pyridostigmine (MESTINON) 60 mg, Oral, 3 times daily        Objective:      Exam  BP (!) 144/78   Ht 6' 3" (1.905 m)   Wt 122.5 kg (270 lb)   BMI 33.75 kg/m²     General:   [x] Unaccompanied   [] Accompanied, by__  heart:   pharynx:    Neurological [x]nl  []Abnml:     Speech:  [] []  vis fields:  [] []  EOMs:  [] []  funduscopic: [] []  Motor:   [] []  coord:   [] []  Gait:   [] []    Neuroimaging:  []Images and imaging reports reviewed.  My comments:     Labs:      Risks:   [x] High   [] Moderate   [] (poss or definite) neurodegenerative condition and inherent progression  [x] () autoimmune condition with possibility of flares or unexpected attack  [] () seiz d.o. with possib of recurr seiz's   [] Cerebrovasc ds with risk of recurrent stroke  [] CNS meds (and/or) potentially high risk non CNS meds taken or discussed which may cause med or behav SE's  [] Fall risk  [] Driving discussed   [] Diagnosis unclear or DDx wide making risk uncertain to " high  [x]:discussed risks of long term steroids     offered rx for cellcept   he declines            Assessment/Plan:       1. Myasthenia gravis      Other comments/ follow up:        cont pres mgmt     Follow up in about 4 months (around 9/22/2023).         Christian Pena MD RITA FAAN [facility]

## 2023-06-02 ENCOUNTER — HOSPITAL ENCOUNTER (OUTPATIENT)
Dept: RADIOLOGY | Facility: HOSPITAL | Age: 68
Discharge: HOME | End: 2023-06-02
Attending: FAMILY MEDICINE
Payer: MEDICARE

## 2023-06-02 DIAGNOSIS — M54.50 LOW BACK PAIN: ICD-10-CM

## 2023-06-02 PROCEDURE — 72100 X-RAY EXAM L-S SPINE 2/3 VWS: CPT | Mod: TC

## 2023-07-14 DIAGNOSIS — G70.00 MYASTHENIA GRAVIS: ICD-10-CM

## 2023-07-14 RX ORDER — PREDNISONE 10 MG/1
10 TABLET ORAL DAILY
Qty: 30 TABLET | Refills: 2 | Status: SHIPPED | OUTPATIENT
Start: 2023-07-14 | End: 2023-08-22 | Stop reason: SDUPTHER

## 2023-08-22 ENCOUNTER — OFFICE VISIT (OUTPATIENT)
Dept: NEUROLOGY | Facility: CLINIC | Age: 68
End: 2023-08-22
Payer: MEDICARE

## 2023-08-22 VITALS
DIASTOLIC BLOOD PRESSURE: 88 MMHG | WEIGHT: 277 LBS | BODY MASS INDEX: 34.44 KG/M2 | HEIGHT: 75 IN | SYSTOLIC BLOOD PRESSURE: 128 MMHG

## 2023-08-22 DIAGNOSIS — G70.00 MYASTHENIA GRAVIS: Primary | ICD-10-CM

## 2023-08-22 PROCEDURE — 1160F RVW MEDS BY RX/DR IN RCRD: CPT | Mod: CPTII,S$GLB,, | Performed by: SPECIALIST

## 2023-08-22 PROCEDURE — 1159F MED LIST DOCD IN RCRD: CPT | Mod: CPTII,S$GLB,, | Performed by: SPECIALIST

## 2023-08-22 PROCEDURE — 1101F PR PT FALLS ASSESS DOC 0-1 FALLS W/OUT INJ PAST YR: ICD-10-PCS | Mod: CPTII,S$GLB,, | Performed by: SPECIALIST

## 2023-08-22 PROCEDURE — 99999 PR PBB SHADOW E&M-EST. PATIENT-LVL III: ICD-10-PCS | Mod: PBBFAC,,, | Performed by: SPECIALIST

## 2023-08-22 PROCEDURE — 3288F FALL RISK ASSESSMENT DOCD: CPT | Mod: CPTII,S$GLB,, | Performed by: SPECIALIST

## 2023-08-22 PROCEDURE — 99999 PR PBB SHADOW E&M-EST. PATIENT-LVL III: CPT | Mod: PBBFAC,,, | Performed by: SPECIALIST

## 2023-08-22 PROCEDURE — 99214 OFFICE O/P EST MOD 30 MIN: CPT | Mod: S$GLB,,, | Performed by: SPECIALIST

## 2023-08-22 PROCEDURE — 4010F ACE/ARB THERAPY RXD/TAKEN: CPT | Mod: CPTII,S$GLB,, | Performed by: SPECIALIST

## 2023-08-22 PROCEDURE — 3074F SYST BP LT 130 MM HG: CPT | Mod: CPTII,S$GLB,, | Performed by: SPECIALIST

## 2023-08-22 PROCEDURE — 1101F PT FALLS ASSESS-DOCD LE1/YR: CPT | Mod: CPTII,S$GLB,, | Performed by: SPECIALIST

## 2023-08-22 PROCEDURE — 3074F PR MOST RECENT SYSTOLIC BLOOD PRESSURE < 130 MM HG: ICD-10-PCS | Mod: CPTII,S$GLB,, | Performed by: SPECIALIST

## 2023-08-22 PROCEDURE — 99214 PR OFFICE/OUTPT VISIT, EST, LEVL IV, 30-39 MIN: ICD-10-PCS | Mod: S$GLB,,, | Performed by: SPECIALIST

## 2023-08-22 PROCEDURE — 4010F PR ACE/ARB THEARPY RXD/TAKEN: ICD-10-PCS | Mod: CPTII,S$GLB,, | Performed by: SPECIALIST

## 2023-08-22 PROCEDURE — 1159F PR MEDICATION LIST DOCUMENTED IN MEDICAL RECORD: ICD-10-PCS | Mod: CPTII,S$GLB,, | Performed by: SPECIALIST

## 2023-08-22 PROCEDURE — 3288F PR FALLS RISK ASSESSMENT DOCUMENTED: ICD-10-PCS | Mod: CPTII,S$GLB,, | Performed by: SPECIALIST

## 2023-08-22 PROCEDURE — 1160F PR REVIEW ALL MEDS BY PRESCRIBER/CLIN PHARMACIST DOCUMENTED: ICD-10-PCS | Mod: CPTII,S$GLB,, | Performed by: SPECIALIST

## 2023-08-22 PROCEDURE — 3079F DIAST BP 80-89 MM HG: CPT | Mod: CPTII,S$GLB,, | Performed by: SPECIALIST

## 2023-08-22 PROCEDURE — 3079F PR MOST RECENT DIASTOLIC BLOOD PRESSURE 80-89 MM HG: ICD-10-PCS | Mod: CPTII,S$GLB,, | Performed by: SPECIALIST

## 2023-08-22 PROCEDURE — 3008F PR BODY MASS INDEX (BMI) DOCUMENTED: ICD-10-PCS | Mod: CPTII,S$GLB,, | Performed by: SPECIALIST

## 2023-08-22 PROCEDURE — 3008F BODY MASS INDEX DOCD: CPT | Mod: CPTII,S$GLB,, | Performed by: SPECIALIST

## 2023-08-22 RX ORDER — PYRIDOSTIGMINE BROMIDE 60 MG/1
60 TABLET ORAL 3 TIMES DAILY
Qty: 270 TABLET | Refills: 3 | Status: SHIPPED | OUTPATIENT
Start: 2023-08-22 | End: 2024-04-01 | Stop reason: SDUPTHER

## 2023-08-22 RX ORDER — PREDNISONE 5 MG/1
TABLET ORAL
Qty: 60 TABLET | Refills: 5 | Status: SHIPPED | OUTPATIENT
Start: 2023-08-22 | End: 2023-11-13 | Stop reason: SDUPTHER

## 2023-08-22 NOTE — PROGRESS NOTES
"Subjective:         Patient ID: Ronak Saha is a 68 y.o. male.    Chief Complaint: 4 month MG f/u     HPI:           (Here for 4 month MG f/u//Pt reports incr in irritability and mood swings recently, believes it may be medication related. Taking Prednisone 10 mg daily and Mestinon 60 mg TID. Some mild blurred vision recently; scheduled to est w new optho next month)      notes may also be on facesheet for HPI, ROS, and other sections     ROS:             Social History     Tobacco Use    Smoking status: Never     Passive exposure: Never    Smokeless tobacco: Never   Substance Use Topics    Alcohol use: Never    Drug use: Never      __Lives alone  Lives with ___  __Drives   __Does not drive   __Working   Retired:     Current Outpatient Medications   Medication Instructions    amlodipine-benazepril 5-20 mg (LOTREL) 5-20 mg per capsule 1 capsule, Oral    atorvastatin (LIPITOR) 20 mg, Oral    fluticasone propionate (FLONASE) 50 mcg/actuation nasal spray 2 sprays, Each Nostril    hydroCHLOROthiazide (HYDRODIURIL) 25 mg, Oral    omeprazole (PRILOSEC) 20 mg, Oral    predniSONE (DELTASONE) 10 mg, Oral, Daily    pyridostigmine (MESTINON) 60 mg, Oral, 3 times daily        Objective:      Exam  /88 (BP Location: Left arm, Patient Position: Sitting)   Ht 6' 3" (1.905 m)   Wt 125.6 kg (277 lb)   BMI 34.62 kg/m²     General:   [x] Unaccompanied   [] Accompanied, by__  heart: rrr  pharynx:    Neurological []nl  []Abnml:     Speech:  [x] []  vis fields:  [] []  EOMs:  [x] []  funduscopic: [] []  Motor:   [] []  coord:   [] []  Gait:   [x] []    Neuroimaging:  []Images and imaging reports reviewed.  My comments:     Labs:      []  Multiple Issues/ diagnoses or problems  [if not enumerated in note then discussed but not documented]    Complexity of Data:   [] High    [] Moderate   [] Images and reports reviewed  [] Other studies reviewed [] History obtained from accompaniment [] Differential Diagnoses discussed   [] " Studies considered/ discussed but not ordered [] Studies ordered     Risks:   [x] High     [] Moderate   [] (poss or definite) neurodegenerative condition [x] () autoimmune condition with possibility of flares or unexpected attack  [] () seiz d.o. with possib of recurr seiz's  [] Cerebrovasc ds with risk of recurrent stroke  [x] CNS meds (and/or) potentially high risk non CNS meds taken or discussed which may cause med or behav SE's  [] Fall risk [] Driving discussed  [] Diagnosis unclear or DDx wide making risk uncertain   []:    MDM:    [] High     [x] Moderate               Assessment/Plan:         ICD-10-CM ICD-9-CM   1. Myasthenia gravis  G70.00 358.00         Other comments/ follow up:        Imaging orders (if any):   Medications Ordered This Encounter   Medications    predniSONE (DELTASONE) 5 MG tablet     Sig: For aug and sept take 5mg alternating with 10mg every other day. On oct 1 start 5mg every day. On nov 1 can decrease to 5mg every other day     Dispense:  60 tablet     Refill:  5    pyridostigmine (MESTINON) 60 mg Tab     Sig: Take 1 tablet (60 mg total) by mouth 3 (three) times daily.     Dispense:  270 tablet     Refill:  3     __med modified    Follow up in about 3 months (around 11/22/2023).         Christian Pena MD [facility]

## 2023-11-04 ENCOUNTER — PATIENT MESSAGE (OUTPATIENT)
Dept: NEUROLOGY | Facility: CLINIC | Age: 68
End: 2023-11-04
Payer: MEDICARE

## 2023-11-06 NOTE — TELEPHONE ENCOUNTER
Spoke w him  Asked him to do 5mg alternating every other day with 10mg so 5mg then 10mg then 5mg until sees J in clinic in couple of weeks     Mentioned that I was considering starting him on cellcept to get prednisone dose as low as possible   Await to see how he does on 5/10 alternating     If cellcept started in near future can be 1000mg bid

## 2023-11-10 ENCOUNTER — PATIENT MESSAGE (OUTPATIENT)
Dept: NEUROLOGY | Facility: CLINIC | Age: 68
End: 2023-11-10
Payer: MEDICARE

## 2023-11-10 DIAGNOSIS — G70.00 MYASTHENIA GRAVIS: Primary | ICD-10-CM

## 2023-11-10 NOTE — TELEPHONE ENCOUNTER
S/w patient; I asked that he increase the Prednisone dose to 10 mg daily; will talk to Dr. HERNÁNDEZ Monday morning to see if he wants to pivot to Cellcept or try Vyvgart or Hytrulo

## 2023-11-13 DIAGNOSIS — G70.00 MYASTHENIA GRAVIS: Primary | ICD-10-CM

## 2023-11-13 RX ORDER — MYCOPHENOLATE MOFETIL 500 MG/1
1000 TABLET ORAL 2 TIMES DAILY
Qty: 120 TABLET | Refills: 11 | Status: SHIPPED | OUTPATIENT
Start: 2023-11-13 | End: 2023-11-30 | Stop reason: SDUPTHER

## 2023-11-13 RX ORDER — PREDNISONE 5 MG/1
TABLET ORAL
Qty: 60 TABLET | Refills: 5 | Status: SHIPPED | OUTPATIENT
Start: 2023-11-13 | End: 2023-11-30 | Stop reason: SDUPTHER

## 2023-11-14 ENCOUNTER — LAB VISIT (OUTPATIENT)
Dept: LAB | Facility: HOSPITAL | Age: 68
End: 2023-11-14
Attending: NURSE PRACTITIONER
Payer: MEDICARE

## 2023-11-14 DIAGNOSIS — G70.00 MYASTHENIA GRAVIS: ICD-10-CM

## 2023-11-14 LAB
ALBUMIN SERPL-MCNC: 3.3 G/DL (ref 3.4–4.8)
ALBUMIN/GLOB SERPL: 1 RATIO (ref 1.1–2)
ALP SERPL-CCNC: 64 UNIT/L (ref 40–150)
ALT SERPL-CCNC: 19 UNIT/L (ref 0–55)
AST SERPL-CCNC: 17 UNIT/L (ref 5–34)
BASOPHILS # BLD AUTO: 0.05 X10(3)/MCL
BASOPHILS NFR BLD AUTO: 0.5 %
BILIRUB SERPL-MCNC: 1.2 MG/DL
BUN SERPL-MCNC: 16 MG/DL (ref 8.4–25.7)
CALCIUM SERPL-MCNC: 9.2 MG/DL (ref 8.8–10)
CHLORIDE SERPL-SCNC: 100 MMOL/L (ref 98–107)
CO2 SERPL-SCNC: 31 MMOL/L (ref 23–31)
CREAT SERPL-MCNC: 0.77 MG/DL (ref 0.73–1.18)
EOSINOPHIL # BLD AUTO: 0.06 X10(3)/MCL (ref 0–0.9)
EOSINOPHIL NFR BLD AUTO: 0.6 %
ERYTHROCYTE [DISTWIDTH] IN BLOOD BY AUTOMATED COUNT: 15 % (ref 11.5–17)
GFR SERPLBLD CREATININE-BSD FMLA CKD-EPI: >60 MLS/MIN/1.73/M2
GLOBULIN SER-MCNC: 3.2 GM/DL (ref 2.4–3.5)
GLUCOSE SERPL-MCNC: 147 MG/DL (ref 82–115)
HBV CORE AB SERPL QL IA: NONREACTIVE
HBV SURFACE AG SERPL QL IA: NONREACTIVE
HCT VFR BLD AUTO: 48.8 % (ref 42–52)
HCV AB SERPL QL IA: NONREACTIVE
HGB BLD-MCNC: 15.7 G/DL (ref 14–18)
IMM GRANULOCYTES # BLD AUTO: 0.03 X10(3)/MCL (ref 0–0.04)
IMM GRANULOCYTES NFR BLD AUTO: 0.3 %
LYMPHOCYTES # BLD AUTO: 1.01 X10(3)/MCL (ref 0.6–4.6)
LYMPHOCYTES NFR BLD AUTO: 10.5 %
MCH RBC QN AUTO: 28.9 PG (ref 27–31)
MCHC RBC AUTO-ENTMCNC: 32.2 G/DL (ref 33–36)
MCV RBC AUTO: 89.7 FL (ref 80–94)
MONOCYTES # BLD AUTO: 0.54 X10(3)/MCL (ref 0.1–1.3)
MONOCYTES NFR BLD AUTO: 5.6 %
NEUTROPHILS # BLD AUTO: 7.91 X10(3)/MCL (ref 2.1–9.2)
NEUTROPHILS NFR BLD AUTO: 82.5 %
PLATELET # BLD AUTO: 232 X10(3)/MCL (ref 130–400)
PMV BLD AUTO: 9.7 FL (ref 7.4–10.4)
POTASSIUM SERPL-SCNC: 3.9 MMOL/L (ref 3.5–5.1)
PROT SERPL-MCNC: 6.5 GM/DL (ref 5.8–7.6)
RBC # BLD AUTO: 5.44 X10(6)/MCL (ref 4.7–6.1)
SODIUM SERPL-SCNC: 141 MMOL/L (ref 136–145)
WBC # SPEC AUTO: 9.6 X10(3)/MCL (ref 4.5–11.5)

## 2023-11-14 PROCEDURE — 80053 COMPREHEN METABOLIC PANEL: CPT

## 2023-11-14 PROCEDURE — 85025 COMPLETE CBC W/AUTO DIFF WBC: CPT

## 2023-11-14 PROCEDURE — 86711 JOHN CUNNINGHAM ANTIBODY: CPT

## 2023-11-14 PROCEDURE — 86704 HEP B CORE ANTIBODY TOTAL: CPT

## 2023-11-14 PROCEDURE — 36415 COLL VENOUS BLD VENIPUNCTURE: CPT

## 2023-11-14 PROCEDURE — 87340 HEPATITIS B SURFACE AG IA: CPT

## 2023-11-14 PROCEDURE — 86803 HEPATITIS C AB TEST: CPT

## 2023-11-15 ENCOUNTER — PATIENT MESSAGE (OUTPATIENT)
Dept: NEUROLOGY | Facility: CLINIC | Age: 68
End: 2023-11-15
Payer: MEDICARE

## 2023-11-16 ENCOUNTER — PATIENT MESSAGE (OUTPATIENT)
Dept: NEUROLOGY | Facility: CLINIC | Age: 68
End: 2023-11-16
Payer: MEDICARE

## 2023-11-20 ENCOUNTER — PATIENT MESSAGE (OUTPATIENT)
Dept: NEUROLOGY | Facility: CLINIC | Age: 68
End: 2023-11-20
Payer: MEDICARE

## 2023-11-24 ENCOUNTER — PATIENT MESSAGE (OUTPATIENT)
Dept: NEUROLOGY | Facility: CLINIC | Age: 68
End: 2023-11-24
Payer: MEDICARE

## 2023-11-29 LAB — BEAKER SEE SCANNED REPORT: NORMAL

## 2023-11-30 ENCOUNTER — OFFICE VISIT (OUTPATIENT)
Dept: NEUROLOGY | Facility: CLINIC | Age: 68
End: 2023-11-30
Payer: MEDICARE

## 2023-11-30 ENCOUNTER — TELEPHONE (OUTPATIENT)
Dept: NEUROLOGY | Facility: CLINIC | Age: 68
End: 2023-11-30

## 2023-11-30 DIAGNOSIS — G70.00 MYASTHENIA GRAVIS: Primary | ICD-10-CM

## 2023-11-30 PROCEDURE — 4010F ACE/ARB THERAPY RXD/TAKEN: CPT | Mod: CPTII,95,, | Performed by: SPECIALIST

## 2023-11-30 PROCEDURE — 99213 PR OFFICE/OUTPT VISIT, EST, LEVL III, 20-29 MIN: ICD-10-PCS | Mod: 95,,, | Performed by: SPECIALIST

## 2023-11-30 PROCEDURE — 4010F PR ACE/ARB THEARPY RXD/TAKEN: ICD-10-PCS | Mod: CPTII,95,, | Performed by: SPECIALIST

## 2023-11-30 PROCEDURE — 99213 OFFICE O/P EST LOW 20 MIN: CPT | Mod: 95,,, | Performed by: SPECIALIST

## 2023-11-30 RX ORDER — PREDNISONE 5 MG/1
TABLET ORAL
Qty: 90 TABLET | Refills: 5 | Status: SHIPPED | OUTPATIENT
Start: 2023-11-30 | End: 2024-01-30 | Stop reason: SDUPTHER

## 2023-11-30 RX ORDER — MYCOPHENOLATE MOFETIL 500 MG/1
500 TABLET ORAL DAILY
Qty: 1 TABLET | Refills: 0
Start: 2023-11-30 | End: 2024-01-30 | Stop reason: SDUPTHER

## 2023-11-30 NOTE — PROGRESS NOTES
This is a telemedicine note.   Patient was treated using telemedicine, real time audio and video, according to [facility] protocols.   I, Christian Pena MD, conducted the visit from the Neurology clinic of [facility].   The patient participated in the visit at a non-[facility] location selected by the patient, identified below.   I am licensed in the state where the patient stated they are located.   The patient stated that they understood and accepted the privacy and security risks to their information at their location.   This visit is not recorded.    Patient was located at the patient's home.     Ronak Saha is a 68 y.o. male seen today via telemedicine visit.       Subjective:         Patient ID: Ronak Saha is a 68 y.o. male.    Chief Complaint: MR roman     HPI:           Cellcept at 1g bid assoc with nightsweats and fast HR causing him to awaken after having fallen asleep   Stopped the cellcept and these symptoms stopped   asked about his JCV Ab test (was positive)       notes may also be on facesheet for HPI, ROS, and other sections     Review of Systems    MG  1st vis: 2.2023  Chest CT had been ok   Labs: pos AchR Ab   had not tolerated Imuran   8/22/23 reported irritability prob due to prednisone           Social History     Socioeconomic History    Marital status:    Tobacco Use    Smoking status: Never     Passive exposure: Never    Smokeless tobacco: Never   Substance and Sexual Activity    Alcohol use: Never    Drug use: Never     __Lives alone  Lives with ___  __Drives   __Does not drive   __Working   Retired:     Current Outpatient Medications   Medication Instructions    amlodipine-benazepril 5-20 mg (LOTREL) 5-20 mg per capsule 1 capsule, Oral    atorvastatin (LIPITOR) 20 mg, Oral    fluticasone propionate (FLONASE) 50 mcg/actuation nasal spray 2 sprays, Each Nostril    hydroCHLOROthiazide (HYDRODIURIL) 25 mg, Oral    mycophenolate (CELLCEPT) 1,000 mg, Oral, 2 times daily     omeprazole (PRILOSEC) 20 mg, Oral    predniSONE (DELTASONE) 5 MG tablet 2 tabs daily    pyridostigmine (MESTINON) 60 mg, Oral, 3 times daily        Objective:      Exam Limited due to telemedicine restrictions.  There were no vitals taken for this visit.    General:   if accompanied, by:_    Neurological  Speech: clear   EOMs: not well seen   room he was in dimly lit   coord:   Gait:     Neuroimaging:  Images and imaging reports reviewed.  My comments:     Labs:    meds:          Assessment/Plan:       Problem List Items Addressed This Visit          Neuro    Myasthenia gravis - Primary       Other comments/ follow up:      Prednisone 10mg alternating with 15mg until Rahul one then 10mg daily   Resume cellcept at 500mg only in am until further notice please     Imaging orders (if any):   No orders of the defined types were placed in this encounter.        Sched to see us 1/30    No follow-ups on file.    Video Time Documentation:  Spent 7 minutes with patient over video discussing health concerns.   Total time this visit:    15  min    Christian Pena MD [facility]

## 2024-01-30 ENCOUNTER — OFFICE VISIT (OUTPATIENT)
Dept: NEUROLOGY | Facility: CLINIC | Age: 69
End: 2024-01-30
Payer: MEDICARE

## 2024-01-30 VITALS
HEIGHT: 75 IN | DIASTOLIC BLOOD PRESSURE: 74 MMHG | SYSTOLIC BLOOD PRESSURE: 118 MMHG | BODY MASS INDEX: 34.82 KG/M2 | WEIGHT: 280 LBS

## 2024-01-30 DIAGNOSIS — G70.00 MYASTHENIA GRAVIS: ICD-10-CM

## 2024-01-30 DIAGNOSIS — E66.01 MORBID (SEVERE) OBESITY DUE TO EXCESS CALORIES: Primary | ICD-10-CM

## 2024-01-30 PROCEDURE — 3074F SYST BP LT 130 MM HG: CPT | Mod: CPTII,S$GLB,, | Performed by: NURSE PRACTITIONER

## 2024-01-30 PROCEDURE — 3008F BODY MASS INDEX DOCD: CPT | Mod: CPTII,S$GLB,, | Performed by: NURSE PRACTITIONER

## 2024-01-30 PROCEDURE — 1101F PT FALLS ASSESS-DOCD LE1/YR: CPT | Mod: CPTII,S$GLB,, | Performed by: NURSE PRACTITIONER

## 2024-01-30 PROCEDURE — 99214 OFFICE O/P EST MOD 30 MIN: CPT | Mod: S$GLB,,, | Performed by: NURSE PRACTITIONER

## 2024-01-30 PROCEDURE — 3288F FALL RISK ASSESSMENT DOCD: CPT | Mod: CPTII,S$GLB,, | Performed by: NURSE PRACTITIONER

## 2024-01-30 PROCEDURE — 99999 PR PBB SHADOW E&M-EST. PATIENT-LVL III: CPT | Mod: PBBFAC,,, | Performed by: NURSE PRACTITIONER

## 2024-01-30 PROCEDURE — 1159F MED LIST DOCD IN RCRD: CPT | Mod: CPTII,S$GLB,, | Performed by: NURSE PRACTITIONER

## 2024-01-30 PROCEDURE — 1160F RVW MEDS BY RX/DR IN RCRD: CPT | Mod: CPTII,S$GLB,, | Performed by: NURSE PRACTITIONER

## 2024-01-30 PROCEDURE — 4010F ACE/ARB THERAPY RXD/TAKEN: CPT | Mod: CPTII,S$GLB,, | Performed by: NURSE PRACTITIONER

## 2024-01-30 PROCEDURE — 3078F DIAST BP <80 MM HG: CPT | Mod: CPTII,S$GLB,, | Performed by: NURSE PRACTITIONER

## 2024-01-30 RX ORDER — PREDNISONE 5 MG/1
TABLET ORAL
Qty: 45 TABLET | Refills: 5 | Status: SHIPPED | OUTPATIENT
Start: 2024-01-30 | End: 2024-04-01 | Stop reason: SDUPTHER

## 2024-01-30 RX ORDER — MYCOPHENOLATE MOFETIL 500 MG/1
500 TABLET ORAL 2 TIMES DAILY
Qty: 60 TABLET | Refills: 3
Start: 2024-01-30 | End: 2024-01-31 | Stop reason: SDUPTHER

## 2024-01-30 NOTE — PROGRESS NOTES
"  Neurology Follow up Note    Subjective:         Patient ID: Ronak Saha is a 68 y.o. male.    Chief Complaint: MG    HPI:            Pt reports symptoms stable since last visit; currently taking Cellcept 500 mg AM, prednisone 5 mg daily, and Mestinon 60 mg TID.    Denies diplopia  Denies dyspnea  Denies weakness  Denies choking    ROS: as per HPI, otherwise pertinent systems review is negative          Past Medical History:   Diagnosis Date    Hypertension     Meniere's disease, unspecified ear     Myasthenia gravis        Past Surgical History:   Procedure Laterality Date    CHOLECYSTECTOMY      HERNIA REPAIR         Family History   Problem Relation Age of Onset    No Known Problems Mother     No Known Problems Father        Social History     Socioeconomic History    Marital status:    Tobacco Use    Smoking status: Never     Passive exposure: Never    Smokeless tobacco: Never   Substance and Sexual Activity    Alcohol use: Never    Drug use: Never       Review of patient's allergies indicates:   Allergen Reactions    Penicillins Rash    Cephalexin Palpitations       Current Outpatient Medications   Medication Instructions    amlodipine-benazepril 5-20 mg (LOTREL) 5-20 mg per capsule 1 capsule, Oral    atorvastatin (LIPITOR) 20 mg, Oral, Daily    fluticasone propionate (FLONASE) 50 mcg/actuation nasal spray 2 sprays, Each Nostril    hydroCHLOROthiazide (HYDRODIURIL) 25 mg, Oral, Daily    mycophenolate (CELLCEPT) 500 mg, Oral, Daily    omeprazole (PRILOSEC) 20 mg, Oral, Daily    predniSONE (DELTASONE) 5 MG tablet 2 tabs/ alternating with three tabs every other day   so Mon two tabs tues three tabs wed two tabs etc    pyridostigmine (MESTINON) 60 mg, Oral, 3 times daily       Objective:      Exam:   Visit Vitals  /74 (BP Location: Left arm, Patient Position: Sitting)   Ht 6' 3" (1.905 m)   Wt 127 kg (280 lb)   BMI 35.00 kg/m²       Physical Exam  Vitals reviewed.   Constitutional:       " Appearance: Normal appearance. Overweight     Accompanied by: alone  HENT:      Ears:      Comments: Hearing normal.  Eyes:      Extraocular Movements: Extraocular movements intact. No ptosis     VF's ok  Cardiovascular:      Rate and Rhythm: Normal rate and regular rhythm.   Pulmonary:      Effort: Pulmonary effort is normal.      Breath sounds: Normal breath sounds.   Musculoskeletal:         General: Normal range of motion.   Skin:     General: Skin is warm and dry.   Neurological:      General: No focal deficit present.      Mental Status: alert and oriented to person, place, and time.      Speech: nml     Face: symmetric; tongue midline; cheek puff nml     Motor: nonlateralizing; stands from chair 2 attempts; deltoids ok; neck      flexors ok     Coordination: F to N ok, no dysmetria     Tremor: none     Gait: unassisted and normal.  Psychiatric:         Mood and Affect: Mood normal.         Behavior: Behavior normal.         Assessment/Plan:   1. Morbid (severe) obesity due to excess calories  Diet exercise and weight loss encouraged    2. Myasthenia gravis  Reduce the dose of Prednisone to 2.5 mg alternating every other day with 5 mg for 4 weeks then decrease to 2.5 mg daily     Increase Cellcept to 500 mg qAm and at bed time    Continue the Mestinon 60 mg three times per day     Follow up in about 2 months (around 3/30/2024), or if symptoms worsen or fail to improve.      Moses Bedolla, MSN, APRN, AGACNP-BC

## 2024-01-31 DIAGNOSIS — G70.00 MYASTHENIA GRAVIS: ICD-10-CM

## 2024-01-31 DIAGNOSIS — G70.00 MYASTHENIA GRAVIS: Primary | ICD-10-CM

## 2024-01-31 RX ORDER — MYCOPHENOLATE MOFETIL 500 MG/1
500 TABLET ORAL 2 TIMES DAILY
Qty: 60 TABLET | Refills: 3
Start: 2024-01-31 | End: 2024-01-31 | Stop reason: SDUPTHER

## 2024-01-31 RX ORDER — MYCOPHENOLATE MOFETIL 500 MG/1
500 TABLET ORAL 2 TIMES DAILY
Qty: 60 TABLET | Refills: 3 | Status: CANCELLED
Start: 2024-01-31

## 2024-01-31 RX ORDER — MYCOPHENOLATE MOFETIL 500 MG/1
500 TABLET ORAL 2 TIMES DAILY
Qty: 60 TABLET | Refills: 3 | Status: SHIPPED | OUTPATIENT
Start: 2024-01-31 | End: 2024-04-01 | Stop reason: SDUPTHER

## 2024-03-24 ENCOUNTER — PATIENT MESSAGE (OUTPATIENT)
Dept: NEUROLOGY | Facility: CLINIC | Age: 69
End: 2024-03-24
Payer: MEDICARE

## 2024-04-01 ENCOUNTER — OFFICE VISIT (OUTPATIENT)
Dept: NEUROLOGY | Facility: CLINIC | Age: 69
End: 2024-04-01
Payer: MEDICARE

## 2024-04-01 VITALS
DIASTOLIC BLOOD PRESSURE: 74 MMHG | WEIGHT: 280 LBS | HEIGHT: 75 IN | SYSTOLIC BLOOD PRESSURE: 124 MMHG | BODY MASS INDEX: 34.82 KG/M2

## 2024-04-01 DIAGNOSIS — G70.00 MYASTHENIA GRAVIS: Primary | ICD-10-CM

## 2024-04-01 PROCEDURE — 3074F SYST BP LT 130 MM HG: CPT | Mod: CPTII,S$GLB,, | Performed by: NURSE PRACTITIONER

## 2024-04-01 PROCEDURE — 3078F DIAST BP <80 MM HG: CPT | Mod: CPTII,S$GLB,, | Performed by: NURSE PRACTITIONER

## 2024-04-01 PROCEDURE — 1159F MED LIST DOCD IN RCRD: CPT | Mod: CPTII,S$GLB,, | Performed by: NURSE PRACTITIONER

## 2024-04-01 PROCEDURE — 1160F RVW MEDS BY RX/DR IN RCRD: CPT | Mod: CPTII,S$GLB,, | Performed by: NURSE PRACTITIONER

## 2024-04-01 PROCEDURE — 3008F BODY MASS INDEX DOCD: CPT | Mod: CPTII,S$GLB,, | Performed by: NURSE PRACTITIONER

## 2024-04-01 PROCEDURE — 99999 PR PBB SHADOW E&M-EST. PATIENT-LVL III: CPT | Mod: PBBFAC,,, | Performed by: NURSE PRACTITIONER

## 2024-04-01 PROCEDURE — 99214 OFFICE O/P EST MOD 30 MIN: CPT | Mod: S$GLB,,, | Performed by: NURSE PRACTITIONER

## 2024-04-01 PROCEDURE — 4010F ACE/ARB THERAPY RXD/TAKEN: CPT | Mod: CPTII,S$GLB,, | Performed by: NURSE PRACTITIONER

## 2024-04-01 RX ORDER — PYRIDOSTIGMINE BROMIDE 60 MG/1
60 TABLET ORAL 3 TIMES DAILY
Qty: 270 TABLET | Refills: 3 | Status: SHIPPED | OUTPATIENT
Start: 2024-04-01 | End: 2024-06-11 | Stop reason: SDUPTHER

## 2024-04-01 RX ORDER — MYCOPHENOLATE MOFETIL 500 MG/1
500 TABLET ORAL 2 TIMES DAILY
Qty: 180 TABLET | Refills: 3 | Status: SHIPPED | OUTPATIENT
Start: 2024-04-01

## 2024-04-01 RX ORDER — PREDNISONE 5 MG/1
TABLET ORAL
Qty: 45 TABLET | Refills: 5 | Status: SHIPPED | OUTPATIENT
Start: 2024-04-01 | End: 2024-04-08 | Stop reason: DRUGHIGH

## 2024-04-01 NOTE — PROGRESS NOTES
"  Neurology Follow up Note    Subjective:         Patient ID: Ronak Saha is a 69 y.o. male.    Chief Complaint: F/U MG.    HPI:            Overall, stable   Pt states he is doing fine.     He eyes get tired after a while w dry eyes.     Denies double vision.  Denies dyspnea   Denies weakness  Denies GI related SE's    Mestinon 60 mg TID  Prednisone 2.5 mg alt with 5 mg qod   CellCept 500 mg BID     ROS: as per HPI, otherwise pertinent systems review is negative          Past Medical History:   Diagnosis Date    Hypertension     Meniere's disease, unspecified ear     Myasthenia gravis        Past Surgical History:   Procedure Laterality Date    CHOLECYSTECTOMY      HERNIA REPAIR         Family History   Problem Relation Age of Onset    No Known Problems Mother     No Known Problems Father        Social History     Socioeconomic History    Marital status:    Tobacco Use    Smoking status: Never     Passive exposure: Never    Smokeless tobacco: Never   Substance and Sexual Activity    Alcohol use: Never    Drug use: Never       Review of patient's allergies indicates:   Allergen Reactions    Penicillins Rash    Cephalexin Palpitations       Current Outpatient Medications   Medication Instructions    amlodipine-benazepril 5-20 mg (LOTREL) 5-20 mg per capsule 1 capsule, Oral    atorvastatin (LIPITOR) 20 mg, Oral, Daily    fluticasone propionate (FLONASE) 50 mcg/actuation nasal spray 2 sprays, Each Nostril    hydroCHLOROthiazide (HYDRODIURIL) 25 mg, Oral, Daily    mycophenolate (CELLCEPT) 500 mg, Oral, 2 times daily    omeprazole (PRILOSEC) 20 mg, Oral, Daily    predniSONE (DELTASONE) 5 MG tablet 1 tab alternating with 0.5 tab every other day for 4 weeks then 0.5 tabs daily    pyridostigmine (MESTINON) 60 mg, Oral, 3 times daily       Objective:      Exam:   Visit Vitals  /74   Ht 6' 3" (1.905 m)   Wt 127 kg (280 lb)   BMI 35.00 kg/m²       Physical Exam  Vitals reviewed.   Constitutional:       " Appearance: Normal appearance. Overweight     Accompanied by: alone  HENT:      Ears:      Comments: Hearing normal.  Eyes:      Extraocular Movements: Extraocular movements intact. No ptosis     VF's ok  Cardiovascular:      Rate and Rhythm: Normal rate and regular rhythm.   Pulmonary:      Effort: Pulmonary effort is normal.      Breath sounds: Normal breath sounds.   Musculoskeletal:         General: Normal range of motion.   Skin:     General: Skin is warm and dry.   Neurological:      General: No focal deficit present.      Mental Status: alert and oriented to person, place, and time.      Speech: nml     Face: symmetric; tongue midline; cheek puff nml     Motor: nonlateralizing; unable to stand with arms folded; deltoids ok;      neck flexors ok     Coordination: F to N ok, no dysmetria     Tremor: none     Gait: unassisted and normal.  Psychiatric:         Mood and Affect: Mood normal.         Behavior: Behavior normal.         Assessment/Plan:   1. Myasthenia gravis  Continue the Cell Cept 500 mg every morning and at bed time     Continue the Mestinon 60 mg three times per day     Decrease the Prednisone; 1.25 mg alternate with 2.5 mg every other day for 4 weeks then decrease to 1.25 mg daily thereafter     He has routine labs coming up in May 2024    Follow up in about 2 months (around 6/1/2024), or if symptoms worsen or fail to improve.      Moses Bedolla, MSN, APRN, AGACNP-BC

## 2024-04-08 ENCOUNTER — PATIENT MESSAGE (OUTPATIENT)
Dept: NEUROLOGY | Facility: CLINIC | Age: 69
End: 2024-04-08
Payer: MEDICARE

## 2024-04-08 DIAGNOSIS — G70.00 MYASTHENIA GRAVIS: Primary | ICD-10-CM

## 2024-04-08 RX ORDER — PREDNISONE 1 MG/1
2.5 TABLET ORAL DAILY
Qty: 75 TABLET | Refills: 5 | Status: SHIPPED | OUTPATIENT
Start: 2024-04-08 | End: 2024-06-10 | Stop reason: SDUPTHER

## 2024-05-07 ENCOUNTER — PATIENT MESSAGE (OUTPATIENT)
Dept: NEUROLOGY | Facility: CLINIC | Age: 69
End: 2024-05-07
Payer: MEDICARE

## 2024-05-28 ENCOUNTER — PATIENT MESSAGE (OUTPATIENT)
Dept: NEUROLOGY | Facility: CLINIC | Age: 69
End: 2024-05-28
Payer: MEDICARE

## 2024-06-07 ENCOUNTER — PATIENT MESSAGE (OUTPATIENT)
Dept: NEUROLOGY | Facility: CLINIC | Age: 69
End: 2024-06-07
Payer: MEDICARE

## 2024-06-07 DIAGNOSIS — G70.00 MYASTHENIA GRAVIS: ICD-10-CM

## 2024-06-10 RX ORDER — PREDNISONE 1 MG/1
2 TABLET ORAL DAILY
Qty: 60 TABLET | Refills: 5 | Status: SHIPPED | OUTPATIENT
Start: 2024-06-10

## 2024-06-11 DIAGNOSIS — G70.00 MYASTHENIA GRAVIS: ICD-10-CM

## 2024-06-11 RX ORDER — PYRIDOSTIGMINE BROMIDE 60 MG/1
60 TABLET ORAL 3 TIMES DAILY
Qty: 270 TABLET | Refills: 3 | Status: SHIPPED | OUTPATIENT
Start: 2024-06-11 | End: 2025-06-11

## 2024-07-02 ENCOUNTER — OFFICE VISIT (OUTPATIENT)
Dept: NEUROLOGY | Facility: CLINIC | Age: 69
End: 2024-07-02
Payer: MEDICARE

## 2024-07-02 VITALS
BODY MASS INDEX: 34.82 KG/M2 | WEIGHT: 280 LBS | DIASTOLIC BLOOD PRESSURE: 82 MMHG | HEIGHT: 75 IN | SYSTOLIC BLOOD PRESSURE: 124 MMHG

## 2024-07-02 DIAGNOSIS — H53.2 DIPLOPIA: ICD-10-CM

## 2024-07-02 DIAGNOSIS — G70.00 MYASTHENIA GRAVIS: Primary | ICD-10-CM

## 2024-07-02 PROCEDURE — 4010F ACE/ARB THERAPY RXD/TAKEN: CPT | Mod: CPTII,S$GLB,, | Performed by: NURSE PRACTITIONER

## 2024-07-02 PROCEDURE — 3008F BODY MASS INDEX DOCD: CPT | Mod: CPTII,S$GLB,, | Performed by: NURSE PRACTITIONER

## 2024-07-02 PROCEDURE — 1101F PT FALLS ASSESS-DOCD LE1/YR: CPT | Mod: CPTII,S$GLB,, | Performed by: NURSE PRACTITIONER

## 2024-07-02 PROCEDURE — 1159F MED LIST DOCD IN RCRD: CPT | Mod: CPTII,S$GLB,, | Performed by: NURSE PRACTITIONER

## 2024-07-02 PROCEDURE — 3079F DIAST BP 80-89 MM HG: CPT | Mod: CPTII,S$GLB,, | Performed by: NURSE PRACTITIONER

## 2024-07-02 PROCEDURE — 3074F SYST BP LT 130 MM HG: CPT | Mod: CPTII,S$GLB,, | Performed by: NURSE PRACTITIONER

## 2024-07-02 PROCEDURE — 1160F RVW MEDS BY RX/DR IN RCRD: CPT | Mod: CPTII,S$GLB,, | Performed by: NURSE PRACTITIONER

## 2024-07-02 PROCEDURE — 99999 PR PBB SHADOW E&M-EST. PATIENT-LVL III: CPT | Mod: PBBFAC,,, | Performed by: NURSE PRACTITIONER

## 2024-07-02 PROCEDURE — 3288F FALL RISK ASSESSMENT DOCD: CPT | Mod: CPTII,S$GLB,, | Performed by: NURSE PRACTITIONER

## 2024-07-02 PROCEDURE — 99214 OFFICE O/P EST MOD 30 MIN: CPT | Mod: S$GLB,,, | Performed by: NURSE PRACTITIONER

## 2024-07-02 RX ORDER — PREDNISONE 1 MG/1
3 TABLET ORAL DAILY
Qty: 270 TABLET | Refills: 1 | Status: SHIPPED | OUTPATIENT
Start: 2024-07-02

## 2024-07-02 NOTE — PROGRESS NOTES
"  Neurology Follow up Note    Subjective:         Patient ID: Ronak Saha is a 69 y.o. male.    Chief Complaint: 3 month MG f/u    HPI:            + double vision when he looks left or right but not directly ahead; corrects when he covers one eye    Denies ptosis  Denies dyspnea   Denies weakness  Denies GI related SE's     Mestinon 60 mg TID  CellCept 500 mg BID  Prednisone 2 mg qd    ROS: as per HPI, otherwise pertinent systems review is negative          Past Medical History:   Diagnosis Date    Hypertension     Meniere's disease, unspecified ear     Myasthenia gravis        Past Surgical History:   Procedure Laterality Date    CHOLECYSTECTOMY      HERNIA REPAIR         Family History   Problem Relation Name Age of Onset    No Known Problems Mother      No Known Problems Father         Social History     Socioeconomic History    Marital status:    Tobacco Use    Smoking status: Never     Passive exposure: Never    Smokeless tobacco: Never   Substance and Sexual Activity    Alcohol use: Never    Drug use: Never       Review of patient's allergies indicates:   Allergen Reactions    Penicillins Rash    Cephalexin Palpitations       Current Outpatient Medications   Medication Instructions    amlodipine-benazepril 5-20 mg (LOTREL) 5-20 mg per capsule 1 capsule, Oral    atorvastatin (LIPITOR) 20 mg, Oral, Daily    fluticasone propionate (FLONASE) 50 mcg/actuation nasal spray 2 sprays, Each Nostril    hydroCHLOROthiazide (HYDRODIURIL) 25 mg, Oral, Daily    mycophenolate (CELLCEPT) 500 mg, Oral, 2 times daily    omeprazole (PRILOSEC) 20 mg, Oral, Daily    predniSONE (DELTASONE) 3 mg, Oral, Daily    pyridostigmine (MESTINON) 60 mg, Oral, 3 times daily       Objective:      Exam:   Visit Vitals  /82 (BP Location: Left arm, Patient Position: Sitting)   Ht 6' 3" (1.905 m)   Wt 127 kg (280 lb)   BMI 35.00 kg/m²       Physical Exam  Vitals reviewed.   Constitutional:       Appearance: Normal appearance. " Overweight     Accompanied by: alone  HENT:      Ears:      Comments: Hearing normal.  Eyes:      Extraocular Movements: Extraocular movements intact. No ptosis     +diplopia when looking left or right  Cardiovascular:      Rate and Rhythm: Normal rate and regular rhythm.   Pulmonary:      Effort: Pulmonary effort is normal.      Breath sounds: Normal breath sounds.   Musculoskeletal:         General: Normal range of motion.   Skin:     General: Skin is warm and dry.   Neurological:      General: No focal deficit present.      Mental Status: alert and oriented to person, place, and time.      Speech: nml     Face: symmetric; tongue midline; cheek puff weak     Motor: nonlateralizing; stands with arms folded; deltoids ok;      neck flexors ok     Coordination: F to N ok, no dysmetria     Tremor: none     Gait: unassisted and normal.  Psychiatric:         Mood and Affect: Mood normal.         Behavior: Behavior normal.         Assessment/Plan:   1. Myasthenia gravis  Continue the cellcept  Continue the Mestinon    Increase Prednisone to 3 mg daily    Patch eye as needed, alternating eyes, allowing breaks between patching    Driving discussed     Considered increasing Cellcept     He asked for further eval by ophtho so I obliged and generated referral to Dr. Annetta Gutierrez - I am confident his diplopia is 2.2 MG as his AChR antibodies were positive.     Follow up in about 2 months (around 9/2/2024).      Moses Bedolla, MSN, APRN, AGACNP-BC

## 2024-07-22 ENCOUNTER — PATIENT MESSAGE (OUTPATIENT)
Dept: NEUROLOGY | Facility: CLINIC | Age: 69
End: 2024-07-22
Payer: MEDICARE

## 2024-08-12 ENCOUNTER — PATIENT MESSAGE (OUTPATIENT)
Dept: NEUROLOGY | Facility: CLINIC | Age: 69
End: 2024-08-12
Payer: MEDICARE

## 2024-08-26 ENCOUNTER — PATIENT MESSAGE (OUTPATIENT)
Dept: NEUROLOGY | Facility: CLINIC | Age: 69
End: 2024-08-26
Payer: MEDICARE

## 2024-08-27 NOTE — TELEPHONE ENCOUNTER
Please contact patient to reschedule appt as requested in portal message sent to our clinic. Thank you!

## 2024-09-10 ENCOUNTER — OFFICE VISIT (OUTPATIENT)
Dept: OPHTHALMOLOGY | Facility: CLINIC | Age: 69
End: 2024-09-10
Payer: MEDICARE

## 2024-09-10 VITALS — WEIGHT: 280 LBS | BODY MASS INDEX: 34.82 KG/M2 | HEIGHT: 75 IN

## 2024-09-10 DIAGNOSIS — H25.13 AGE-RELATED NUCLEAR CATARACT OF BOTH EYES: ICD-10-CM

## 2024-09-10 DIAGNOSIS — H47.239 LARGE PHYSIOLOGIC CUPPING OF OPTIC DISC: ICD-10-CM

## 2024-09-10 DIAGNOSIS — H25.9 SENILE CATARACT, UNSPECIFIED AGE-RELATED CATARACT TYPE, UNSPECIFIED LATERALITY: ICD-10-CM

## 2024-09-10 DIAGNOSIS — G70.00 OCULAR MYASTHENIA GRAVIS: Primary | ICD-10-CM

## 2024-09-10 DIAGNOSIS — H02.403 PTOSIS OF BOTH EYELIDS: ICD-10-CM

## 2024-09-10 DIAGNOSIS — H02.889 MEIBOMIAN GLAND DISEASE, UNSPECIFIED LATERALITY: ICD-10-CM

## 2024-09-10 DIAGNOSIS — H53.2 DIPLOPIA: ICD-10-CM

## 2024-09-10 DIAGNOSIS — H04.129 DRY EYE: ICD-10-CM

## 2024-09-10 PROCEDURE — 92133 CPTRZD OPH DX IMG PST SGM ON: CPT | Mod: PBBFAC,PN | Performed by: STUDENT IN AN ORGANIZED HEALTH CARE EDUCATION/TRAINING PROGRAM

## 2024-09-10 PROCEDURE — 99213 OFFICE O/P EST LOW 20 MIN: CPT | Mod: PBBFAC,PN | Performed by: STUDENT IN AN ORGANIZED HEALTH CARE EDUCATION/TRAINING PROGRAM

## 2024-09-10 RX ORDER — LIFITEGRAST 50 MG/ML
1 SOLUTION/ DROPS OPHTHALMIC 2 TIMES DAILY
Qty: 60 EACH | Refills: 3 | Status: SHIPPED | OUTPATIENT
Start: 2024-09-10

## 2024-09-10 NOTE — PROGRESS NOTES
HPI    Referred for diplopia and dry eyes  Double vision has slightly resolved after using oral steroids   Last edited by Annetta Mcdonald MA on 9/10/2024 11:30 AM.            Assessment /Plan     For exam results, see Encounter Report.    Ocular myasthenia gravis    Diplopia  -     Ambulatory referral/consult to Ophthalmology    Dry eye    Meibomian gland disease, unspecified laterality    Age-related nuclear cataract of both eyes    Large physiologic cupping of optic disc    Other orders  -     lifitegrast (XIIDRA) 5 % Dpet; Apply 1 drop to eye 2 (two) times a day.  Dispense: 60 each; Refill: 3                     Octn 9/10/24: 99//97 all green ou- no signs glaucoma ou       Ocular MG ou   - dx 2022 wiht double vision and varying ptosis of right eye mostly, + ACHR bodies 2023. Ptosis and double vision has improved since starting treatment with neurology   - ct chest wo thymoma 2/2023  - follows with neuro on pred/pyridostigmine and cellcept. Weaning Pred as able  - initial visit 9/10/24: ortho in primary, sl limitation in abduction OS, otherwise normal exam, lids in correct position other than ARIS as below   - cont fu with neurology, agree with ocular MG diagnosis     2. ARIS with blepharoptosis OS>OD  - LF excellent ou, mrd1 1//.5. no fatigue ability currently  - Will get taped untaped and refer to plastics as pt believes is VS currently, though will need MG under full control prior        3. MARCO/mgd ou   - failed restasis  - restart pfats 4-6x daily, cont gel nightly and increase Wcs  - will try xiidra 9/10/24 pt may be good candidate for miebo in future if xiidra no help     4. Cataract ou   - nvs, mrx iven 9/10/24    5. Physiologic cupping  - large nerve and c/d .7 ou, iop wnl   - octn 9/10/24 no signs thinning ou   - Will follow with yearly octn ou for now    6. PVD OS  - asymptomatic  - rd precautions given   - yearly dfe       Rtc 4 months k check, VF taped untaped prior

## 2024-09-23 ENCOUNTER — OFFICE VISIT (OUTPATIENT)
Dept: NEUROLOGY | Facility: CLINIC | Age: 69
End: 2024-09-23
Payer: MEDICARE

## 2024-09-23 VITALS
DIASTOLIC BLOOD PRESSURE: 64 MMHG | HEIGHT: 75 IN | BODY MASS INDEX: 34.82 KG/M2 | SYSTOLIC BLOOD PRESSURE: 132 MMHG | WEIGHT: 280 LBS

## 2024-09-23 DIAGNOSIS — H53.2 DIPLOPIA: ICD-10-CM

## 2024-09-23 DIAGNOSIS — G70.00 MYASTHENIA GRAVIS: Primary | ICD-10-CM

## 2024-09-23 PROCEDURE — 3008F BODY MASS INDEX DOCD: CPT | Mod: CPTII,S$GLB,, | Performed by: SPECIALIST

## 2024-09-23 PROCEDURE — 4010F ACE/ARB THERAPY RXD/TAKEN: CPT | Mod: CPTII,S$GLB,, | Performed by: SPECIALIST

## 2024-09-23 PROCEDURE — 1160F RVW MEDS BY RX/DR IN RCRD: CPT | Mod: CPTII,S$GLB,, | Performed by: SPECIALIST

## 2024-09-23 PROCEDURE — 3078F DIAST BP <80 MM HG: CPT | Mod: CPTII,S$GLB,, | Performed by: SPECIALIST

## 2024-09-23 PROCEDURE — 99214 OFFICE O/P EST MOD 30 MIN: CPT | Mod: S$GLB,,, | Performed by: SPECIALIST

## 2024-09-23 PROCEDURE — 99999 PR PBB SHADOW E&M-EST. PATIENT-LVL III: CPT | Mod: PBBFAC,,, | Performed by: SPECIALIST

## 2024-09-23 PROCEDURE — 3075F SYST BP GE 130 - 139MM HG: CPT | Mod: CPTII,S$GLB,, | Performed by: SPECIALIST

## 2024-09-23 PROCEDURE — 1159F MED LIST DOCD IN RCRD: CPT | Mod: CPTII,S$GLB,, | Performed by: SPECIALIST

## 2024-09-23 NOTE — PROGRESS NOTES
"  Neurology Note    Subjective:       Patient ID: Ronak Saha is a 69 y.o. male.    Chief Complaint: F/U MG.    HPI:            Reports doing well from MG standpoint. Diplopia has improved since increasing prednisone to 5mg daily. Saw eye Dr last week and was told he has really bad dry eyes. Getting new glasses. Doing warm compresses to eyes and taking Xidra and it is helping.    ROS: as per HPI, otherwise pertinent systems review is negative          Past Medical History:   Diagnosis Date    Hypertension     Meniere's disease, unspecified ear     Myasthenia gravis        Past Surgical History:   Procedure Laterality Date    CHOLECYSTECTOMY      HERNIA REPAIR         Family History   Problem Relation Name Age of Onset    No Known Problems Mother      No Known Problems Father      Glaucoma Maternal Grandmother         Social History     Socioeconomic History    Marital status:    Tobacco Use    Smoking status: Never     Passive exposure: Never    Smokeless tobacco: Never   Substance and Sexual Activity    Alcohol use: Never    Drug use: Never       Review of patient's allergies indicates:   Allergen Reactions    Penicillins Rash    Cephalexin Palpitations       Current Outpatient Medications   Medication Instructions    amlodipine-benazepril 5-20 mg (LOTREL) 5-20 mg per capsule 1 capsule, Oral    atorvastatin (LIPITOR) 20 mg, Oral, Daily    hydroCHLOROthiazide (HYDRODIURIL) 25 mg, Oral, Daily    lifitegrast (XIIDRA) 5 % Dpet 1 drop, Ophthalmic, 2 times daily    mycophenolate (CELLCEPT) 500 mg, Oral, 2 times daily    omeprazole (PRILOSEC) 20 mg, Oral, Daily    predniSONE (DELTASONE) 5 mg, Oral, Daily    pyridostigmine (MESTINON) 60 mg, Oral, 3 times daily       Objective:      Exam:   Visit Vitals  /64   Ht 6' 3" (1.905 m)   Wt 127 kg (280 lb)   BMI 35.00 kg/m²       Physical Exam  Constitutional: Appearance: No acute distress, well developed & well nourished  HENT: Head: " normocephalic/atraumatic  Eyes: Conjunctiva clear, non-icteric  Cardiovascular: Regular rate and rhythm, no murmur  Pulmonary:  Pulmonary effort is normal  Musculoskeletal: General: Normal range of motion  Skin: Skin is warm and dry, no obvious lesions  Peripheral extremities: no edema  Psychiatric: mood and affect normal. Behavior normal, cooperative     Neuro exam:    Mental status:  The patient is alert and oriented to person, place and time.  Language is intact and fluent  Normal attention and concentration  Cranial Nerves:  EOM intact, no nystagmus, no ptosis, no gaze preference or deviation    Visual fields are full to confrontation testing  Facial movement is symmetric  Hearing is intact   Coordination:     Finger to nose - normal and symmetric bilaterally  No dysmetria  Motor:  Normal muscle bulk and symmetry  Strength grossly normal, deltoids ok, able to stand with arms folded  No lateralizing features  Gait:  Normal gait, unassisted  Tremor: none    Review of Data:   Prior notes reviewed         Assessment/Plan:   1. Myasthenia gravis    2. Diplopia      Wean prednisone down--Alternate prednisone 5mg and 2.5mg every other day for 2 weeks, then take 2.5mg daily thereafter.    Follow-up January virtual visit    Dr. Pena physically present in exam room during patient encounter.   IRoxie, ZEINA acted solely as a scribe for and in the presence of Dr. Pena who performed the service.    Roxie Guerrero, MSN, APRN, FNP-C  [facility]  [phone]

## 2024-12-09 ENCOUNTER — PATIENT MESSAGE (OUTPATIENT)
Dept: OPHTHALMOLOGY | Facility: CLINIC | Age: 69
End: 2024-12-09
Payer: MEDICARE

## 2024-12-31 ENCOUNTER — TELEPHONE (OUTPATIENT)
Dept: OPHTHALMOLOGY | Facility: CLINIC | Age: 69
End: 2024-12-31
Payer: MEDICARE

## 2024-12-31 NOTE — TELEPHONE ENCOUNTER
----- Message from Vernon sent at 12/31/2024  8:49 AM CST -----  The above pt is on their last bottle of eye drops and will need more before his next appt. He wants to know if he can have more refills sent out.

## 2025-01-03 RX ORDER — LIFITEGRAST 50 MG/ML
1 SOLUTION/ DROPS OPHTHALMIC 2 TIMES DAILY
Qty: 180 EACH | Refills: 3 | Status: SHIPPED | OUTPATIENT
Start: 2025-01-03

## 2025-01-23 ENCOUNTER — PATIENT MESSAGE (OUTPATIENT)
Dept: NEUROLOGY | Facility: CLINIC | Age: 70
End: 2025-01-23

## 2025-01-23 ENCOUNTER — OFFICE VISIT (OUTPATIENT)
Dept: NEUROLOGY | Facility: CLINIC | Age: 70
End: 2025-01-23
Payer: MEDICARE

## 2025-01-23 DIAGNOSIS — G70.00 MYASTHENIA GRAVIS: ICD-10-CM

## 2025-01-23 DIAGNOSIS — E66.01 MORBID (SEVERE) OBESITY DUE TO EXCESS CALORIES: ICD-10-CM

## 2025-01-23 RX ORDER — PREDNISONE 1 MG/1
1 TABLET ORAL DAILY
Start: 2025-01-23

## 2025-01-23 NOTE — PROGRESS NOTES
Virtual Visit Note    Subjective:          Patient ID: Ronak Saha is a 69 y.o. male.    Chief Complaint: MG follow up     HPI:           The patient location is: home  Visit type: audiovisual    Decreased Prednisone to 2.5 mg daily at last OV. Doing great    No issues with diplopia  No issues with shortness of breath  No weakness    Taking CellCept 500 mg BID  Taking Mestinon 60 mg TID    Denies SE's      This is a telemedicine note. After obtaining verbal consent/patient identification using name and , patient was treated using real time audio/video, according to [facility] protocols. Moses HERNANDES NP [distant provider], conducted the visit from location identified below. The patient participated in the visit at a non-[facility] location selected by the patient (or patients representative), identified below. I am licensed in the state where the patient stated they are located. The patient (or patients representative) stated that they understood and accepted the privacy and security risks to their information at their location.    Distant provider was located at [facility]    Each patient to whom he or she provides medical services by telemedicine is:  (1) informed of the relationship between the physician and patient and the respective role of any other health care provider with respect to management of the patient; and (2) notified that he or she may decline to receive medical services by telemedicine and may withdraw from such care at any time.            Past Medical History:   Diagnosis Date    Hypertension     Meniere's disease, unspecified ear     Myasthenia gravis        Past Surgical History:   Procedure Laterality Date    CHOLECYSTECTOMY      HERNIA REPAIR         Family History   Problem Relation Name Age of Onset    No Known Problems Mother      No Known Problems Father      Glaucoma Maternal Grandmother         Social History     Socioeconomic History    Marital status:     Tobacco Use    Smoking status: Never     Passive exposure: Never    Smokeless tobacco: Never   Substance and Sexual Activity    Alcohol use: Never    Drug use: Never       Review of patient's allergies indicates:   Allergen Reactions    Penicillins Rash    Cephalexin Palpitations       Current Outpatient Medications   Medication Instructions    amlodipine-benazepril 5-20 mg (LOTREL) 5-20 mg per capsule 1 capsule, Oral    atorvastatin (LIPITOR) 20 mg, Oral, Daily    hydroCHLOROthiazide (HYDRODIURIL) 25 mg, Oral, Daily    lifitegrast (XIIDRA) 5 % Dpet 1 drop, Ophthalmic, 2 times daily    mycophenolate (CELLCEPT) 500 mg, Oral, 2 times daily    omeprazole (PRILOSEC) 20 mg, Oral, Daily    predniSONE (DELTASONE) 2.5 mg, Oral, Daily    pyridostigmine (MESTINON) 60 mg, Oral, 3 times daily         Objective:      Physical Exam:  General- Patient alert and oriented x3 in NAD  Speech - nml  HEENT- EOMI  Resp- No increased WOB noted. Not using accessory muscles.  Skin-  No Jaundice. No visible skin lesions.        Assessment/Plan:     Problem List Items Addressed This Visit       Myasthenia gravis    Morbid (severe) obesity due to excess calories    Decrease Prednisone to 1 mg alt every other day with 2.5 mg for 2 weeks then decrease to 1 mg daily thereafter. No other med changes today. He has enough 1 mg tabs and did not require a refill today.    Follow up 8-10 weeks - isreal Bedolla, MSN, APRN, AGACNP-BC

## 2025-02-03 ENCOUNTER — HOSPITAL ENCOUNTER (EMERGENCY)
Facility: HOSPITAL | Age: 70
Discharge: HOME | End: 2025-02-03
Attending: INTERNAL MEDICINE
Payer: MEDICARE

## 2025-02-03 VITALS
BODY MASS INDEX: 34.19 KG/M2 | TEMPERATURE: 98 F | WEIGHT: 275 LBS | RESPIRATION RATE: 18 BRPM | OXYGEN SATURATION: 96 % | DIASTOLIC BLOOD PRESSURE: 80 MMHG | HEART RATE: 71 BPM | SYSTOLIC BLOOD PRESSURE: 149 MMHG | HEIGHT: 75 IN

## 2025-02-03 DIAGNOSIS — L30.9 ECZEMA, UNSPECIFIED TYPE: ICD-10-CM

## 2025-02-03 DIAGNOSIS — L23.1 ALLERGIC CONTACT DERMATITIS DUE TO ADHESIVES: Primary | ICD-10-CM

## 2025-02-03 LAB
ANION GAP SERPL CALC-SCNC: 8 MEQ/L
BACTERIA #/AREA URNS AUTO: ABNORMAL /HPF
BASOPHILS # BLD AUTO: 0.06 X10(3)/MCL
BASOPHILS NFR BLD AUTO: 0.8 %
BILIRUB UR QL STRIP.AUTO: ABNORMAL
BUN SERPL-MCNC: 16 MG/DL (ref 8.4–25.7)
CALCIUM SERPL-MCNC: 9.1 MG/DL (ref 8.8–10)
CHLORIDE SERPL-SCNC: 103 MMOL/L (ref 98–107)
CLARITY UR: CLEAR
CO2 SERPL-SCNC: 30 MMOL/L (ref 23–31)
COLOR UR AUTO: YELLOW
CREAT SERPL-MCNC: 0.96 MG/DL (ref 0.72–1.25)
CREAT/UREA NIT SERPL: 17
EOSINOPHIL # BLD AUTO: 0.45 X10(3)/MCL (ref 0–0.9)
EOSINOPHIL NFR BLD AUTO: 6.2 %
ERYTHROCYTE [DISTWIDTH] IN BLOOD BY AUTOMATED COUNT: 14.6 % (ref 11.5–17)
FLUAV AG UPPER RESP QL IA.RAPID: NOT DETECTED
FLUBV AG UPPER RESP QL IA.RAPID: NOT DETECTED
GFR SERPLBLD CREATININE-BSD FMLA CKD-EPI: >60 ML/MIN/1.73/M2
GLUCOSE SERPL-MCNC: 122 MG/DL (ref 82–115)
GLUCOSE UR QL STRIP: NEGATIVE
HCT VFR BLD AUTO: 49.2 % (ref 42–52)
HGB BLD-MCNC: 16.1 G/DL (ref 14–18)
HGB UR QL STRIP: NEGATIVE
IMM GRANULOCYTES # BLD AUTO: 0.02 X10(3)/MCL (ref 0–0.04)
IMM GRANULOCYTES NFR BLD AUTO: 0.3 %
KETONES UR QL STRIP: ABNORMAL
LEUKOCYTE ESTERASE UR QL STRIP: NEGATIVE
LYMPHOCYTES # BLD AUTO: 0.97 X10(3)/MCL (ref 0.6–4.6)
LYMPHOCYTES NFR BLD AUTO: 13.4 %
MCH RBC QN AUTO: 28.7 PG (ref 27–31)
MCHC RBC AUTO-ENTMCNC: 32.7 G/DL (ref 33–36)
MCV RBC AUTO: 87.7 FL (ref 80–94)
MONOCYTES # BLD AUTO: 0.66 X10(3)/MCL (ref 0.1–1.3)
MONOCYTES NFR BLD AUTO: 9.1 %
MUCOUS THREADS URNS QL MICRO: ABNORMAL /LPF
NEUTROPHILS # BLD AUTO: 5.09 X10(3)/MCL (ref 2.1–9.2)
NEUTROPHILS NFR BLD AUTO: 70.2 %
NITRITE UR QL STRIP: NEGATIVE
NRBC BLD AUTO-RTO: 0 %
PH UR STRIP: 5.5 [PH]
PLATELET # BLD AUTO: 227 X10(3)/MCL (ref 130–400)
PMV BLD AUTO: 9.5 FL (ref 7.4–10.4)
POTASSIUM SERPL-SCNC: 4.2 MMOL/L (ref 3.5–5.1)
PROT UR QL STRIP: ABNORMAL
RBC # BLD AUTO: 5.61 X10(6)/MCL (ref 4.7–6.1)
RBC #/AREA URNS AUTO: ABNORMAL /HPF
SARS-COV-2 RNA RESP QL NAA+PROBE: NOT DETECTED
SODIUM SERPL-SCNC: 141 MMOL/L (ref 136–145)
SP GR UR STRIP.AUTO: >=1.03 (ref 1–1.03)
SQUAMOUS #/AREA URNS AUTO: ABNORMAL /HPF
UROBILINOGEN UR STRIP-ACNC: 1
WBC # BLD AUTO: 7.25 X10(3)/MCL (ref 4.5–11.5)
WBC #/AREA URNS AUTO: ABNORMAL /HPF

## 2025-02-03 PROCEDURE — 80048 BASIC METABOLIC PNL TOTAL CA: CPT | Performed by: INTERNAL MEDICINE

## 2025-02-03 PROCEDURE — 99284 EMERGENCY DEPT VISIT MOD MDM: CPT

## 2025-02-03 PROCEDURE — 63600175 PHARM REV CODE 636 W HCPCS: Performed by: INTERNAL MEDICINE

## 2025-02-03 PROCEDURE — 0240U COVID/FLU A&B PCR: CPT | Performed by: INTERNAL MEDICINE

## 2025-02-03 PROCEDURE — 25000003 PHARM REV CODE 250: Performed by: INTERNAL MEDICINE

## 2025-02-03 PROCEDURE — 85025 COMPLETE CBC W/AUTO DIFF WBC: CPT | Performed by: INTERNAL MEDICINE

## 2025-02-03 PROCEDURE — 81003 URINALYSIS AUTO W/O SCOPE: CPT | Performed by: INTERNAL MEDICINE

## 2025-02-03 RX ORDER — PREDNISONE 10 MG/1
10 TABLET ORAL DAILY
Qty: 30 TABLET | Refills: 0 | Status: SHIPPED | OUTPATIENT
Start: 2025-02-03

## 2025-02-03 RX ORDER — PREDNISONE 20 MG/1
20 TABLET ORAL
Status: COMPLETED | OUTPATIENT
Start: 2025-02-03 | End: 2025-02-03

## 2025-02-03 RX ORDER — HYDROXYZINE HYDROCHLORIDE 25 MG/1
25 TABLET, FILM COATED ORAL EVERY 6 HOURS
Qty: 30 TABLET | Refills: 0 | Status: SHIPPED | OUTPATIENT
Start: 2025-02-03

## 2025-02-03 RX ORDER — HYDROXYZINE PAMOATE 25 MG/1
25 CAPSULE ORAL
Status: COMPLETED | OUTPATIENT
Start: 2025-02-03 | End: 2025-02-03

## 2025-02-03 RX ADMIN — PREDNISONE 20 MG: 20 TABLET ORAL at 08:02

## 2025-02-03 RX ADMIN — HYDROXYZINE PAMOATE 25 MG: 25 CAPSULE ORAL at 08:02

## 2025-02-03 NOTE — DISCHARGE INSTRUCTIONS
Do not use any tape on your body, you can always try paper tape    Apply lotion to the whole body after taking showers      Take medicines as prescribed    See your family doctor in one to 2 days for further evaluation, workup, and treatment as necessary    Avoid driving or operating machinery while taking medicines as some medicines might cause drowsiness and may cause problems. Also pain medicines have potential of being addictive  so use Pain meds specially Narcotics Sparingly.    The exam and treatment you received in Emergency Room was for an urgent problem and NOT INTENDED AS COMPLETE CARE. It is important that you FOLLOW UP with a doctor for ongoing care. If your symptoms become WORSE or you DO NOT IMPROVE and you are unable to reach your health care provider, you should RETURN to the emergency department. The Emergency Room doctor has provided a PRELIMINARY INTERPRETATION of all your STUDIES. A final interpretation may be done after you are discharged. IF A CHANGE in your diagnosis or treatment is needed WE WILL CONTACT YOU. It is critical that we have a CURRENT PHONE NUMBER FOR YOU.

## 2025-02-03 NOTE — ED PROVIDER NOTES
Encounter Date: 2/3/2025  History from patient     History     Chief Complaint   Patient presents with    Rash     C/o rash to lower abd area from tape he used to cover ingroin hair for 1 week     HPI    Ronak Saha is 69 y.o. male who  has a past medical history of Hypertension, Meniere's disease, unspecified ear, and Myasthenia gravis. arrives in ER with c/o Rash (C/o rash to lower abd area from tape he used to cover ingroin hair for 1 week)      Review of patient's allergies indicates:   Allergen Reactions    Penicillins Rash    Cephalexin Palpitations     Past Medical History:   Diagnosis Date    Hypertension     Meniere's disease, unspecified ear     Myasthenia gravis      Past Surgical History:   Procedure Laterality Date    CHOLECYSTECTOMY      HERNIA REPAIR       Family History   Problem Relation Name Age of Onset    No Known Problems Mother      No Known Problems Father      Glaucoma Maternal Grandmother       Social History     Tobacco Use    Smoking status: Never     Passive exposure: Never    Smokeless tobacco: Never   Substance Use Topics    Alcohol use: Never    Drug use: Never     Review of Systems   Constitutional:  Negative for fever.   HENT:  Negative for trouble swallowing and voice change.    Eyes:  Negative for visual disturbance.   Respiratory:  Negative for cough and shortness of breath.    Cardiovascular:  Negative for chest pain.   Gastrointestinal:  Negative for abdominal pain, diarrhea and vomiting.   Genitourinary:  Negative for dysuria and hematuria.   Musculoskeletal:  Negative for back pain and gait problem.   Skin:  Negative for color change and rash.        Rash at the site where he applied tape, in lower abdomen and upper pubic area.    Says had in growing here and applied tape there.  Developed rash    Her so says that he has eczema in his having itching all over.   Neurological:  Negative for headaches.   Psychiatric/Behavioral:  Negative for behavioral problems and sleep  disturbance.    All other systems reviewed and are negative.      Physical Exam     Initial Vitals [02/03/25 0801]   BP Pulse Resp Temp SpO2   (!) 175/80 76 18 98.1 °F (36.7 °C) 97 %      MAP       --         Physical Exam    Nursing note and vitals reviewed.  Constitutional: No distress.   HENT:   Head: Atraumatic.   Eyes: EOM are normal.   Cardiovascular:  Normal heart sounds.           Pulmonary/Chest: Breath sounds normal.   Abdominal: Abdomen is soft. Bowel sounds are normal.   Musculoskeletal:         General: Normal range of motion.      Cervical back: No bony tenderness.     Neurological: He is alert.   Speech Normal   Skin: Skin is dry.   Also patient has looks like chronic rash in the crease of the abdominal wall which is more healed and appears like nickel allergy    Then he has a square area of rash in the upper pubic /lower abdominal area, very had applied the tape he does have some serum oozing from there, there is no induration.  There is no abscess formation.    Patient has some few areas of rash on his hands but most of it is dry skin   Psychiatric: He has a normal mood and affect.   Pleasant         ED Course   Procedures  Orders Placed This Encounter    CBC auto differential    Basic metabolic panel    Urinalysis, Reflex to Urine Culture    COVID/FLU A&B PCR    CBC with Differential    Urinalysis, Microscopic    predniSONE tablet 20 mg    hydrOXYzine pamoate capsule 25 mg    hydrOXYzine HCL (ATARAX) 25 MG tablet    predniSONE (DELTASONE) 10 MG tablet     Labs Reviewed   BASIC METABOLIC PANEL - Abnormal       Result Value    Sodium 141      Potassium 4.2      Chloride 103      CO2 30      Glucose 122 (*)     Blood Urea Nitrogen 16.0      Creatinine 0.96      BUN/Creatinine Ratio 17      Calcium 9.1      Anion Gap 8.0      eGFR >60     URINALYSIS, REFLEX TO URINE CULTURE - Abnormal    Color, UA Yellow      Appearance, UA Clear      Specific Gravity, UA >=1.030      pH, UA 5.5      Protein, UA 1+ (*)      Glucose, UA Negative      Ketones, UA Trace (*)     Blood, UA Negative      Bilirubin, UA 1+ (*)     Urobilinogen, UA 1.0      Nitrites, UA Negative      Leukocyte Esterase, UA Negative     CBC WITH DIFFERENTIAL - Abnormal    WBC 7.25      RBC 5.61      Hgb 16.1      Hct 49.2      MCV 87.7      MCH 28.7      MCHC 32.7 (*)     RDW 14.6      Platelet 227      MPV 9.5      Neut % 70.2      Lymph % 13.4      Mono % 9.1      Eos % 6.2      Basophil % 0.8      Imm Grans % 0.3      Neut # 5.09      Lymph # 0.97      Mono # 0.66      Eos # 0.45      Baso # 0.06      Imm Gran # 0.02      NRBC% 0.0     URINALYSIS, MICROSCOPIC - Abnormal    Bacteria, UA None Seen      Mucous, UA Small (*)     RBC, UA None Seen      WBC, UA None Seen      Squamous Epithelial Cells, UA None Seen     COVID/FLU A&B PCR - Normal    Influenza A PCR Not Detected      Influenza B PCR Not Detected      SARS-CoV-2 PCR Not Detected      Narrative:     The Xpert Xpress SARS-CoV-2/FLU/RSV plus is a rapid, multiplexed real-time PCR test intended for the simultaneous qualitative detection and differentiation of SARS-CoV-2, Influenza A, Influenza B, and respiratory syncytial virus (RSV) viral RNA in either nasopharyngeal swab or nasal swab specimens.         CBC W/ AUTO DIFFERENTIAL    Narrative:     The following orders were created for panel order CBC auto differential.  Procedure                               Abnormality         Status                     ---------                               -----------         ------                     CBC with Differential[[id number]]       Abnormal            Final result                 Please view results for these tests on the individual orders.          Imaging Results    None          Medications   predniSONE tablet 20 mg (20 mg Oral Given 2/3/25 7375)   hydrOXYzine pamoate capsule 25 mg (25 mg Oral Given 2/3/25 8051)     Medical Decision Making    Ronak Saha is 69 y.o. male who  has a past medical  history of Hypertension, Meniere's disease, unspecified ear, and Myasthenia gravis. arrives in ER with c/o Rash (C/o rash to lower abd area from tape he used to cover ingroin hair for 1 week)    Patient essentially says that he applied tape to his groin area because he had ingrowing here.  He developed rash in the area, has been going on for a week and it is not getting better.  Then he started having some rash on his hands and he feels itchy all over, he feels that he is having a generalized allergic reaction.  Because he feels that he is sweaty.    Patient is anxious, his blood pressure is elevated, heart rate is normal, oxygen saturations normal, he does not have any major rash on his body anywhere else except for a few lesions on his hands bilaterally and a sq rash on his lower abdomen.  He does have chronic nonhealing venous ulcers on his left lower extremity and he has a Unna boot in place.    Patient has myasthenia gravis, he is on prednisone, he was on 10 mg, but he says that they have cut it down all the way to 1 mg now, initially I did not want to give him any oral steroid because he had only localized rash.  But now that I know that he was on 10 mg prednisone on a regular basis I will go ahead and give him 1 dose of 20 mg prednisone in the emergency room.        Amount and/or Complexity of Data Reviewed  Labs: ordered.    Risk  Prescription drug management.                                      Clinical Impression:  Final diagnoses:  [L23.1] Allergic contact dermatitis due to adhesives (Primary)  [L30.9] Eczema, unspecified type          ED Disposition Condition    Discharge Stable          ED Prescriptions       Medication Sig Dispense Start Date End Date Auth. Provider    hydrOXYzine HCL (ATARAX) 25 MG tablet Take 1 tablet (25 mg total) by mouth every 6 (six) hours. 30 tablet 2/3/2025 -- Alexa Ward MD    predniSONE (DELTASONE) 10 MG tablet Take 1 tablet (10 mg total) by mouth once daily. 30  tablet 2/3/2025 -- Alexa Ward MD          Follow-up Information       Follow up With Specialties Details Why Contact Info    Colby Sibley MD Family Medicine In 1 day  [address] [place] [address] [phone]               Alexa Ward MD  02/03/25 6185

## 2025-03-27 ENCOUNTER — PATIENT MESSAGE (OUTPATIENT)
Dept: NEUROLOGY | Facility: CLINIC | Age: 70
End: 2025-03-27

## 2025-03-27 ENCOUNTER — OFFICE VISIT (OUTPATIENT)
Dept: NEUROLOGY | Facility: CLINIC | Age: 70
End: 2025-03-27
Payer: MEDICARE

## 2025-03-27 DIAGNOSIS — G70.00 MYASTHENIA GRAVIS: Primary | ICD-10-CM

## 2025-03-27 RX ORDER — MUPIROCIN 20 MG/G
2 OINTMENT TOPICAL 3 TIMES DAILY
COMMUNITY
Start: 2025-03-26

## 2025-03-27 RX ORDER — MYCOPHENOLATE MOFETIL 500 MG/1
500 TABLET ORAL 2 TIMES DAILY
Qty: 180 TABLET | Refills: 3 | Status: SHIPPED | OUTPATIENT
Start: 2025-03-27

## 2025-03-27 RX ORDER — DOXYCYCLINE HYCLATE 100 MG
100 TABLET ORAL 2 TIMES DAILY
COMMUNITY
Start: 2025-03-26

## 2025-03-27 NOTE — PROGRESS NOTES
Virtual Visit Note    Subjective:          Patient ID: Ronak Saha is a 70 y.o. male.    Chief Complaint: MG follow up     HPI:           The patient location is: home   Visit type: audiovisual    Decreased Prednisone to 1 mg daily at last OV. Doing great from a MG standpoint     No issues with diplopia  No issues with shortness of breath  Denies issues swallowing or choking   No weakness     Taking CellCept 500 mg BID  Taking Mestinon 60 mg TID     Denies SE's    He is dealing with a staph infection (MRSA) - varying locations of his body. Went ER - received antibiotics and Prednisone 20 mg; now on second round of antibiotics and Bactroban ointment nasally. R arm has some the lesions; he had an unna boot on LE but that has been removed - suspect venous ulcers    This is a telemedicine note. After obtaining verbal consent/patient identification using name and , patient was treated using real time audio/video, according to [facility] protocols. IMoses NP [distant provider], conducted the visit from location identified below. The patient participated in the visit at a non-[facility] location selected by the patient (or patients representative), identified below. I am licensed in the state where the patient stated they are located. The patient (or patients representative) stated that they understood and accepted the privacy and security risks to their information at their location.    Distant provider was located at [facility]    Each patient to whom he or she provides medical services by telemedicine is:  (1) informed of the relationship between the physician and patient and the respective role of any other health care provider with respect to management of the patient; and (2) notified that he or she may decline to receive medical services by telemedicine and may withdraw from such care at any time.          Past Medical History:   Diagnosis Date    Hypertension     Meniere's  disease, unspecified ear     Myasthenia gravis        Past Surgical History:   Procedure Laterality Date    CHOLECYSTECTOMY      HERNIA REPAIR         Family History   Problem Relation Name Age of Onset    No Known Problems Mother      No Known Problems Father      Glaucoma Maternal Grandmother         Social History     Socioeconomic History    Marital status:    Tobacco Use    Smoking status: Never     Passive exposure: Never    Smokeless tobacco: Never   Substance and Sexual Activity    Alcohol use: Never    Drug use: Never       Review of patient's allergies indicates:   Allergen Reactions    Penicillins Rash    Imuran [azathioprine] Other (See Comments)     High fever    Cephalexin Palpitations       Current Outpatient Medications   Medication Instructions    amlodipine-benazepril 5-20 mg (LOTREL) 5-20 mg per capsule 1 capsule    atorvastatin (LIPITOR) 20 mg, Daily    doxycycline (VIBRA-TABS) 100 mg, 2 times daily    hydroCHLOROthiazide (HYDRODIURIL) 25 mg, Daily    hydrOXYzine HCL (ATARAX) 25 mg, Oral, Every 6 hours    lifitegrast (XIIDRA) 5 % Dpet 1 drop, Ophthalmic, 2 times daily    mupirocin (BACTROBAN) 2 g, 3 times daily    mycophenolate (CELLCEPT) 500 mg, Oral, 2 times daily    omeprazole (PRILOSEC) 20 mg, Daily    predniSONE (DELTASONE) 1 mg, Oral, Daily    pyridostigmine (MESTINON) 60 mg, Oral, 3 times daily         Objective:      Physical Exam:  General- Patient alert and oriented x3 in NAD  Speech - nml  HEENT- EOMI  Resp- No increased WOB noted. Not using accessory muscles.  Skin-  No Jaundice. skin lesions R arm  His breathing is not labored or breathy        Assessment/Plan:     Problem List Items Addressed This Visit       Myasthenia gravis - Primary    MG stable    Reduce Prednisone to 1 mg every other day for a week then 0.5 mg every other day for a week then stop taking.     He is currently receiving treatment for venous ulcer and (MRSA) infection.     Follow up 8 weeks - virtual           Moses Bedolla, MSN, APRN, AGAP-BC

## 2025-05-29 ENCOUNTER — PATIENT MESSAGE (OUTPATIENT)
Dept: NEUROLOGY | Facility: CLINIC | Age: 70
End: 2025-05-29
Payer: MEDICARE

## 2025-06-04 DIAGNOSIS — G70.00 MYASTHENIA GRAVIS: Primary | ICD-10-CM

## 2025-06-04 RX ORDER — PYRIDOSTIGMINE BROMIDE 60 MG/1
TABLET ORAL
Qty: 270 TABLET | Refills: 3 | Status: SHIPPED | OUTPATIENT
Start: 2025-06-04